# Patient Record
Sex: FEMALE | Race: OTHER | Employment: UNEMPLOYED | URBAN - METROPOLITAN AREA
[De-identification: names, ages, dates, MRNs, and addresses within clinical notes are randomized per-mention and may not be internally consistent; named-entity substitution may affect disease eponyms.]

---

## 2021-01-27 ENCOUNTER — HOSPITAL ENCOUNTER (OUTPATIENT)
Dept: LAB | Age: 31
Discharge: HOME | End: 2021-01-27

## 2021-01-27 ENCOUNTER — TELEPHONE (OUTPATIENT)
Dept: FAMILY MEDICINE CLINIC | Age: 31
End: 2021-01-27

## 2021-01-27 ENCOUNTER — OFFICE VISIT (OUTPATIENT)
Dept: FAMILY MEDICINE CLINIC | Age: 31
End: 2021-01-27

## 2021-01-27 VITALS
TEMPERATURE: 98 F | OXYGEN SATURATION: 100 % | HEART RATE: 79 BPM | HEIGHT: 60 IN | SYSTOLIC BLOOD PRESSURE: 124 MMHG | DIASTOLIC BLOOD PRESSURE: 77 MMHG | BODY MASS INDEX: 29.45 KG/M2 | WEIGHT: 150 LBS

## 2021-01-27 DIAGNOSIS — G93.31 POST VIRAL SYNDROME: Primary | ICD-10-CM

## 2021-01-27 DIAGNOSIS — Z23 ENCOUNTER FOR IMMUNIZATION: ICD-10-CM

## 2021-01-27 DIAGNOSIS — O03.9 MISCARRIAGE: ICD-10-CM

## 2021-01-27 DIAGNOSIS — R06.02 SHORTNESS OF BREATH: ICD-10-CM

## 2021-01-27 LAB
ALBUMIN SERPL-MCNC: 4 G/DL (ref 3.5–5)
ALBUMIN/GLOB SERPL: 1.2 {RATIO} (ref 1.1–2.2)
ALP SERPL-CCNC: 73 U/L (ref 45–117)
ALT SERPL-CCNC: 30 U/L (ref 12–78)
ANION GAP SERPL CALC-SCNC: 8 MMOL/L (ref 5–15)
AST SERPL-CCNC: 18 U/L (ref 15–37)
BASOPHILS # BLD: 0 K/UL (ref 0–0.1)
BASOPHILS NFR BLD: 1 % (ref 0–1)
BILIRUB SERPL-MCNC: 0.4 MG/DL (ref 0.2–1)
BUN SERPL-MCNC: 4 MG/DL (ref 6–20)
BUN/CREAT SERPL: 7 (ref 12–20)
CALCIUM SERPL-MCNC: 9.6 MG/DL (ref 8.5–10.1)
CHLORIDE SERPL-SCNC: 109 MMOL/L (ref 97–108)
CO2 SERPL-SCNC: 24 MMOL/L (ref 21–32)
CREAT SERPL-MCNC: 0.61 MG/DL (ref 0.55–1.02)
DIFFERENTIAL METHOD BLD: ABNORMAL
EOSINOPHIL # BLD: 0.1 K/UL (ref 0–0.4)
EOSINOPHIL NFR BLD: 2 % (ref 0–7)
ERYTHROCYTE [DISTWIDTH] IN BLOOD BY AUTOMATED COUNT: 14.9 % (ref 11.5–14.5)
GLOBULIN SER CALC-MCNC: 3.4 G/DL (ref 2–4)
GLUCOSE SERPL-MCNC: 86 MG/DL (ref 65–100)
HCT VFR BLD AUTO: 37.7 % (ref 35–47)
HGB BLD-MCNC: 11.7 G/DL (ref 11.5–16)
HGB BLD-MCNC: 12.1 G/DL
IMM GRANULOCYTES # BLD AUTO: 0 K/UL (ref 0–0.04)
IMM GRANULOCYTES NFR BLD AUTO: 0 % (ref 0–0.5)
LYMPHOCYTES # BLD: 1.8 K/UL (ref 0.8–3.5)
LYMPHOCYTES NFR BLD: 25 % (ref 12–49)
MCH RBC QN AUTO: 25.3 PG (ref 26–34)
MCHC RBC AUTO-ENTMCNC: 31 G/DL (ref 30–36.5)
MCV RBC AUTO: 81.6 FL (ref 80–99)
MONOCYTES # BLD: 0.5 K/UL (ref 0–1)
MONOCYTES NFR BLD: 6 % (ref 5–13)
NEUTS SEG # BLD: 4.8 K/UL (ref 1.8–8)
NEUTS SEG NFR BLD: 66 % (ref 32–75)
NRBC # BLD: 0 K/UL (ref 0–0.01)
NRBC BLD-RTO: 0 PER 100 WBC
PLATELET # BLD AUTO: 264 K/UL (ref 150–400)
PMV BLD AUTO: 12.7 FL (ref 8.9–12.9)
POTASSIUM SERPL-SCNC: 4.1 MMOL/L (ref 3.5–5.1)
PROT SERPL-MCNC: 7.4 G/DL (ref 6.4–8.2)
RBC # BLD AUTO: 4.62 M/UL (ref 3.8–5.2)
SODIUM SERPL-SCNC: 141 MMOL/L (ref 136–145)
WBC # BLD AUTO: 7.3 K/UL (ref 3.6–11)

## 2021-01-27 PROCEDURE — 90471 IMMUNIZATION ADMIN: CPT | Performed by: FAMILY MEDICINE

## 2021-01-27 PROCEDURE — 90686 IIV4 VACC NO PRSV 0.5 ML IM: CPT | Performed by: FAMILY MEDICINE

## 2021-01-27 PROCEDURE — 99203 OFFICE O/P NEW LOW 30 MIN: CPT | Performed by: FAMILY MEDICINE

## 2021-01-27 PROCEDURE — 85018 HEMOGLOBIN: CPT | Performed by: FAMILY MEDICINE

## 2021-01-27 RX ORDER — FLUTICASONE PROPIONATE AND SALMETEROL 55; 14 UG/1; UG/1
1 POWDER, METERED RESPIRATORY (INHALATION) DAILY
Qty: 1 EACH | Refills: 1 | OUTPATIENT
Start: 2021-01-27 | End: 2021-02-10

## 2021-01-27 RX ORDER — ASPIRIN 81 MG/1
81 TABLET ORAL DAILY
Qty: 90 TAB | Refills: 2 | Status: SHIPPED | OUTPATIENT
Start: 2021-01-27 | End: 2021-01-27 | Stop reason: SDUPTHER

## 2021-01-27 RX ORDER — ASPIRIN 81 MG/1
81 TABLET ORAL DAILY
Qty: 90 TAB | Refills: 2 | OUTPATIENT
Start: 2021-01-27 | End: 2021-02-10

## 2021-01-27 RX ORDER — FLUTICASONE PROPIONATE AND SALMETEROL 55; 14 UG/1; UG/1
1 POWDER, METERED RESPIRATORY (INHALATION) DAILY
Qty: 1 EACH | Refills: 1 | Status: SHIPPED | OUTPATIENT
Start: 2021-01-27 | End: 2021-01-27 | Stop reason: SDUPTHER

## 2021-01-27 NOTE — PROGRESS NOTES
Sharon Rodriguez RN administered flu vaccine. Please see scanned consent form for further details. Reviewed AVS, prescription and pharmacy location with patient. AVS printed and given. goodrx coupon card given. Walk in x-ray and care card process explained, financial assistance application given. This has been fully explained to the patient, who indicates understanding and agrees with plan. No further questions at this time.  Enzo Laura RN

## 2021-01-27 NOTE — PROGRESS NOTES
Results for orders placed or performed in visit on 01/27/21   AMB POC HEMOGLOBIN (HGB)   Result Value Ref Range    Hemoglobin (POC) 12.1

## 2021-01-27 NOTE — PROGRESS NOTES
HISTORY OF PRESENT ILLNESS  Romana Sandman is a 26 y.o. female. HPI  Patient states she has been feeling weak. She had COVID 20. The recovery was difficult. She was not admitted to the hospital.  Everyone in the family became sick. She had a sister who had to go to the hospital.    She feels she has a problem with the lungs. She feels her lungs are obstructed and feels  States in the morning when she wakes up or in the cold. She feels there is something pulling in the lungs. She feels tired, she needs to take energy pills. States she didn't know she was pregnant 1/15 she had a heavy bleeding and it was a spontaneous . Since then she feels even more tired. Patient also states that her memory is affected after having covid. She is forgetful. She feels cold all day long. She has to set the thermostat to 76. Patient denies any symptoms of depression. So far she is taking multivitamins and is taking 2 amoxicillins in the morning and 2 in the evening. Review of Systems   Constitutional: Positive for malaise/fatigue. HENT: Negative for ear discharge, ear pain, hearing loss and tinnitus. Eyes: Negative for blurred vision, double vision and photophobia. Respiratory: Positive for cough, sputum production, shortness of breath and wheezing. Cardiovascular: Negative for chest pain, palpitations and orthopnea. Gastrointestinal: Negative for abdominal pain, heartburn, nausea and vomiting. Genitourinary: Negative for dysuria, frequency and urgency. Musculoskeletal: Negative for back pain, myalgias and neck pain. Neurological: Negative for dizziness, tingling and headaches. /77 (BP 1 Location: Left arm, BP Patient Position: Sitting)   Pulse 79   Temp 98 °F (36.7 °C)   Ht 5' 0.32\" (1.532 m)   Wt 150 lb (68 kg)   SpO2 100%   BMI 28.99 kg/m²   Physical Exam  Constitutional:       General: She is not in acute distress.      Appearance: She is not ill-appearing. HENT:      Head: Normocephalic. Right Ear: Tympanic membrane normal. There is no impacted cerumen. Left Ear: Tympanic membrane normal. There is no impacted cerumen. Nose: Nose normal. No congestion or rhinorrhea. Mouth/Throat:      Mouth: Mucous membranes are moist.      Pharynx: No oropharyngeal exudate or posterior oropharyngeal erythema. Eyes:      General:         Right eye: No discharge. Left eye: No discharge. Extraocular Movements: Extraocular movements intact. Pupils: Pupils are equal, round, and reactive to light. Neck:      Musculoskeletal: Normal range of motion and neck supple. No neck rigidity or muscular tenderness. Cardiovascular:      Rate and Rhythm: Normal rate and regular rhythm. Pulses: Normal pulses. Heart sounds: Normal heart sounds. No murmur. Pulmonary:      Effort: Pulmonary effort is normal. No respiratory distress. Breath sounds: Normal breath sounds. No wheezing or rhonchi. Abdominal:      General: Bowel sounds are normal. There is no distension. Palpations: Abdomen is soft. Tenderness: There is no abdominal tenderness. Hernia: No hernia is present. Musculoskeletal: Normal range of motion. General: No swelling or tenderness. Skin:     General: Skin is warm and dry. Coloration: Skin is not jaundiced. Findings: No bruising or erythema. Neurological:      Mental Status: She is alert. ASSESSMENT and PLAN  Diagnoses and all orders for this visit:    1. Post viral syndrome  -     XR CHEST PA LAT; Future  -     aspirin delayed-release 81 mg tablet; Take 1 Tab by mouth daily. 2. Shortness of breath  -     XR CHEST PA LAT; Future  -     fluticasone propion-salmeteroL (AirDuo RespiClick) 53-22 mcg/actuation aepb; Take 1 Inhalation by inhalation daily. 3. Miscarriage  -     AMB POC HEMOGLOBIN (HGB)  -     METABOLIC PANEL, COMPREHENSIVE;  Future  -     CBC WITH AUTOMATED DIFF; Future  -     VITAMIN D, 25 HYDROXY; Future  -     VITAMIN B12; Future  -     TSH 3RD GENERATION; Future  -     HEMOGLOBIN A1C WITH EAG; Future    4. Encounter for immunization  -     INFLUENZA VIRUS [address] IM      26year old female who had covid in may 2020, since then she has had shortness of breath, wheezing, felt tired and has had some confusion. We will check labs today  X ray order  We will try air duo for the next month and start baby aspirin.   Follow up as needed

## 2021-01-28 ENCOUNTER — TELEPHONE (OUTPATIENT)
Dept: FAMILY MEDICINE CLINIC | Age: 31
End: 2021-01-28

## 2021-01-28 LAB
25(OH)D3 SERPL-MCNC: 17.3 NG/ML (ref 30–100)
EST. AVERAGE GLUCOSE BLD GHB EST-MCNC: 108 MG/DL
HBA1C MFR BLD: 5.4 % (ref 4–5.6)
TSH SERPL DL<=0.05 MIU/L-ACNC: 1.58 UIU/ML (ref 0.36–3.74)
VIT B12 SERPL-MCNC: 355 PG/ML (ref 193–986)

## 2021-01-28 RX ORDER — ERGOCALCIFEROL 1.25 MG/1
50000 CAPSULE ORAL
Qty: 12 CAP | Refills: 0 | Status: SHIPPED | OUTPATIENT
Start: 2021-01-28 | End: 2021-04-05 | Stop reason: SDUPTHER

## 2021-01-28 NOTE — PROGRESS NOTES
Vitamin D is low  I have sent a prescription for once a week vitamin D for 12 weeks to her pharmacy  Patient can be informed

## 2021-01-28 NOTE — PROGRESS NOTES
Normal hemoglobin a1c no diabetes  Normal vitamin B12  Normal thyroid  Normal blood count, liver function, kidney function and electrolytes  Patient can be informed

## 2021-01-29 ENCOUNTER — HOSPITAL ENCOUNTER (OUTPATIENT)
Dept: GENERAL RADIOLOGY | Age: 31
Discharge: HOME | End: 2021-01-29

## 2021-01-29 DIAGNOSIS — G93.31 POST VIRAL SYNDROME: ICD-10-CM

## 2021-01-29 DIAGNOSIS — R06.02 SHORTNESS OF BREATH: ICD-10-CM

## 2021-01-29 PROCEDURE — 71046 X-RAY EXAM CHEST 2 VIEWS: CPT

## 2021-01-29 NOTE — PROGRESS NOTES
Tc to the pt. The pt was given entire normal lab result message from the provider. Eloisa Ann assisted with the call. The pt was told about her Vit D was low and a Rx was sent to her Pharm to take take 1 pill a week, for 12 weeks. The pt stated she is on the way to the hospital to get her lungs xray and she was so dizzy she almost fainted. The pt was asked if she was driving and she stated her mother was driving her. The pt was advised to tell the staff at the hospital of this and go to the ED if needed. The pt stated yes she did eat breakfast and drinking plenty of water. She is not pregnant she had a miscarriage 14 days ago ( no bleeding) and she said the CAV  knows, and denies other sx.   Brian Sapp RN

## 2021-02-02 NOTE — PROGRESS NOTES
Tc to the pt with the  Susanne Ordoñez. The pt was given the entire lab results note from the Dr. Mayra Spencer was confirmed. The pt stated she had already been called and went and picked up the Rx and started it Fri. The pt went Fri for xrays. The pt is asking about the xray. The pt was told her chest xray is normal. The pt verbalized understanding.  Cherelle Rdz RN

## 2021-02-09 ENCOUNTER — APPOINTMENT (OUTPATIENT)
Dept: ULTRASOUND IMAGING | Age: 31
End: 2021-02-09
Attending: STUDENT IN AN ORGANIZED HEALTH CARE EDUCATION/TRAINING PROGRAM

## 2021-02-09 ENCOUNTER — HOSPITAL ENCOUNTER (OUTPATIENT)
Age: 31
Setting detail: OBSERVATION
Discharge: HOME | End: 2021-02-10
Attending: EMERGENCY MEDICINE | Admitting: OBSTETRICS & GYNECOLOGY

## 2021-02-09 DIAGNOSIS — N93.9 VAGINAL BLEEDING: Primary | ICD-10-CM

## 2021-02-09 DIAGNOSIS — R42 LIGHTHEADEDNESS: ICD-10-CM

## 2021-02-09 DIAGNOSIS — E87.6 HYPOKALEMIA: ICD-10-CM

## 2021-02-09 DIAGNOSIS — O20.0 THREATENED ABORTION: ICD-10-CM

## 2021-02-09 PROBLEM — Z3A.13 13 WEEKS GESTATION OF PREGNANCY: Status: ACTIVE | Noted: 2021-02-09

## 2021-02-09 LAB
ALBUMIN SERPL-MCNC: 3.6 G/DL (ref 3.5–5)
ALBUMIN/GLOB SERPL: 0.9 {RATIO} (ref 1.1–2.2)
ALP SERPL-CCNC: 71 U/L (ref 45–117)
ALT SERPL-CCNC: 24 U/L (ref 12–78)
ANION GAP SERPL CALC-SCNC: 11 MMOL/L (ref 5–15)
APPEARANCE UR: ABNORMAL
AST SERPL-CCNC: 17 U/L (ref 15–37)
BACTERIA URNS QL MICRO: NEGATIVE /HPF
BASOPHILS # BLD: 0 K/UL (ref 0–0.1)
BASOPHILS NFR BLD: 0 % (ref 0–1)
BILIRUB SERPL-MCNC: 0.4 MG/DL (ref 0.2–1)
BILIRUB UR QL: NEGATIVE
BUN SERPL-MCNC: 6 MG/DL (ref 6–20)
BUN/CREAT SERPL: 7 (ref 12–20)
CALCIUM SERPL-MCNC: 8.7 MG/DL (ref 8.5–10.1)
CHLORIDE SERPL-SCNC: 106 MMOL/L (ref 97–108)
CO2 SERPL-SCNC: 21 MMOL/L (ref 21–32)
COLOR UR: ABNORMAL
COMMENT, HOLDF: NORMAL
COMMENT, HOLDF: NORMAL
CREAT SERPL-MCNC: 0.82 MG/DL (ref 0.55–1.02)
DIFFERENTIAL METHOD BLD: ABNORMAL
EOSINOPHIL # BLD: 0.1 K/UL (ref 0–0.4)
EOSINOPHIL NFR BLD: 1 % (ref 0–7)
EPITH CASTS URNS QL MICRO: ABNORMAL /LPF
ERYTHROCYTE [DISTWIDTH] IN BLOOD BY AUTOMATED COUNT: 13.9 % (ref 11.5–14.5)
GLOBULIN SER CALC-MCNC: 4.2 G/DL (ref 2–4)
GLUCOSE SERPL-MCNC: 83 MG/DL (ref 65–100)
GLUCOSE UR STRIP.AUTO-MCNC: NEGATIVE MG/DL
HCG SERPL-ACNC: ABNORMAL MIU/ML (ref 0–6)
HCT VFR BLD AUTO: 30.6 % (ref 35–47)
HCT VFR BLD AUTO: 37.2 % (ref 35–47)
HGB BLD-MCNC: 11.9 G/DL (ref 11.5–16)
HGB BLD-MCNC: 9.8 G/DL (ref 11.5–16)
HGB UR QL STRIP: ABNORMAL
HYALINE CASTS URNS QL MICRO: ABNORMAL /LPF (ref 0–5)
IMM GRANULOCYTES # BLD AUTO: 0 K/UL (ref 0–0.04)
IMM GRANULOCYTES NFR BLD AUTO: 0 % (ref 0–0.5)
KETONES UR QL STRIP.AUTO: >80 MG/DL
LEUKOCYTE ESTERASE UR QL STRIP.AUTO: ABNORMAL
LYMPHOCYTES # BLD: 2.8 K/UL (ref 0.8–3.5)
LYMPHOCYTES NFR BLD: 27 % (ref 12–49)
MCH RBC QN AUTO: 25.2 PG (ref 26–34)
MCHC RBC AUTO-ENTMCNC: 32 G/DL (ref 30–36.5)
MCV RBC AUTO: 78.6 FL (ref 80–99)
MONOCYTES # BLD: 0.6 K/UL (ref 0–1)
MONOCYTES NFR BLD: 6 % (ref 5–13)
NEUTS SEG # BLD: 6.7 K/UL (ref 1.8–8)
NEUTS SEG NFR BLD: 66 % (ref 32–75)
NITRITE UR QL STRIP.AUTO: NEGATIVE
NRBC # BLD: 0 K/UL (ref 0–0.01)
NRBC BLD-RTO: 0 PER 100 WBC
PH UR STRIP: 6.5 [PH] (ref 5–8)
PLATELET # BLD AUTO: 277 K/UL (ref 150–400)
PMV BLD AUTO: 11.8 FL (ref 8.9–12.9)
POTASSIUM SERPL-SCNC: 3.2 MMOL/L (ref 3.5–5.1)
PROT SERPL-MCNC: 7.8 G/DL (ref 6.4–8.2)
PROT UR STRIP-MCNC: 30 MG/DL
RBC # BLD AUTO: 4.73 M/UL (ref 3.8–5.2)
RBC #/AREA URNS HPF: >100 /HPF (ref 0–5)
SAMPLES BEING HELD,HOLD: NORMAL
SAMPLES BEING HELD,HOLD: NORMAL
SODIUM SERPL-SCNC: 138 MMOL/L (ref 136–145)
SP GR UR REFRACTOMETRY: 1.02 (ref 1–1.03)
UROBILINOGEN UR QL STRIP.AUTO: 1 EU/DL (ref 0.2–1)
WBC # BLD AUTO: 10.3 K/UL (ref 3.6–11)
WBC URNS QL MICRO: ABNORMAL /HPF (ref 0–4)

## 2021-02-09 PROCEDURE — 81001 URINALYSIS AUTO W/SCOPE: CPT

## 2021-02-09 PROCEDURE — 85025 COMPLETE CBC W/AUTO DIFF WBC: CPT

## 2021-02-09 PROCEDURE — 99218 HC RM OBSERVATION: CPT

## 2021-02-09 PROCEDURE — 74011250636 HC RX REV CODE- 250/636: Performed by: STUDENT IN AN ORGANIZED HEALTH CARE EDUCATION/TRAINING PROGRAM

## 2021-02-09 PROCEDURE — 85014 HEMATOCRIT: CPT

## 2021-02-09 PROCEDURE — 76817 TRANSVAGINAL US OBSTETRIC: CPT

## 2021-02-09 PROCEDURE — 84702 CHORIONIC GONADOTROPIN TEST: CPT

## 2021-02-09 PROCEDURE — 74011250637 HC RX REV CODE- 250/637: Performed by: STUDENT IN AN ORGANIZED HEALTH CARE EDUCATION/TRAINING PROGRAM

## 2021-02-09 PROCEDURE — 86900 BLOOD TYPING SEROLOGIC ABO: CPT

## 2021-02-09 PROCEDURE — 36415 COLL VENOUS BLD VENIPUNCTURE: CPT

## 2021-02-09 PROCEDURE — 76801 OB US < 14 WKS SINGLE FETUS: CPT

## 2021-02-09 PROCEDURE — 80053 COMPREHEN METABOLIC PANEL: CPT

## 2021-02-09 PROCEDURE — 99285 EMERGENCY DEPT VISIT HI MDM: CPT

## 2021-02-09 RX ORDER — SODIUM CHLORIDE 0.9 % (FLUSH) 0.9 %
5-40 SYRINGE (ML) INJECTION AS NEEDED
Status: DISCONTINUED | OUTPATIENT
Start: 2021-02-09 | End: 2021-02-10 | Stop reason: HOSPADM

## 2021-02-09 RX ORDER — POTASSIUM CHLORIDE 750 MG/1
30 TABLET, FILM COATED, EXTENDED RELEASE ORAL
Status: COMPLETED | OUTPATIENT
Start: 2021-02-09 | End: 2021-02-09

## 2021-02-09 RX ORDER — SODIUM CHLORIDE 0.9 % (FLUSH) 0.9 %
5-40 SYRINGE (ML) INJECTION EVERY 8 HOURS
Status: DISCONTINUED | OUTPATIENT
Start: 2021-02-09 | End: 2021-02-10 | Stop reason: HOSPADM

## 2021-02-09 RX ORDER — ACETAMINOPHEN 325 MG/1
650 TABLET ORAL
Status: DISCONTINUED | OUTPATIENT
Start: 2021-02-09 | End: 2021-02-10 | Stop reason: HOSPADM

## 2021-02-09 RX ORDER — POLYETHYLENE GLYCOL 3350 17 G/17G
17 POWDER, FOR SOLUTION ORAL DAILY PRN
Status: DISCONTINUED | OUTPATIENT
Start: 2021-02-09 | End: 2021-02-10 | Stop reason: HOSPADM

## 2021-02-09 RX ORDER — ONDANSETRON 2 MG/ML
4 INJECTION INTRAMUSCULAR; INTRAVENOUS
Status: DISCONTINUED | OUTPATIENT
Start: 2021-02-09 | End: 2021-02-10 | Stop reason: HOSPADM

## 2021-02-09 RX ORDER — PROMETHAZINE HYDROCHLORIDE 25 MG/1
12.5 TABLET ORAL
Status: DISCONTINUED | OUTPATIENT
Start: 2021-02-09 | End: 2021-02-10 | Stop reason: HOSPADM

## 2021-02-09 RX ORDER — ACETAMINOPHEN 650 MG/1
650 SUPPOSITORY RECTAL
Status: DISCONTINUED | OUTPATIENT
Start: 2021-02-09 | End: 2021-02-10 | Stop reason: HOSPADM

## 2021-02-09 RX ADMIN — POTASSIUM CHLORIDE 30 MEQ: 750 TABLET, FILM COATED, EXTENDED RELEASE ORAL at 18:33

## 2021-02-09 RX ADMIN — SODIUM CHLORIDE 1000 ML: 9 INJECTION, SOLUTION INTRAVENOUS at 17:29

## 2021-02-09 NOTE — ED PROVIDER NOTES
33yo  female with Vit D deficiency presents for persistent fatigue and lightheadedness since May. States that she had Covid in May 2020 and feels that she never really recovered. Has been weak, dizzy, and fatigued since this time. On 1/15 patient had a spontaneous  after being pregnant for 6 weeks. States that she did not receive prenatal care of care after . She just had terrible cramps and passed large clots. While sitting in the waiting room patient started having profuse vaginal bleeding and passing clots. States she had abdominal cramping for the past 3 weeks since the miscarriage but has not had any vaginal bleeding since then. Denies chest pain, SOB, cough, nausea, and vomiting. No past medical history on file. No past surgical history on file. No family history on file.     Social History     Socioeconomic History    Marital status: SINGLE     Spouse name: Not on file    Number of children: Not on file    Years of education: Not on file    Highest education level: Not on file   Occupational History    Not on file   Social Needs    Financial resource strain: Not on file    Food insecurity     Worry: Not on file     Inability: Not on file    Transportation needs     Medical: Not on file     Non-medical: Not on file   Tobacco Use    Smoking status: Never Smoker    Smokeless tobacco: Never Used   Substance and Sexual Activity    Alcohol use: Not Currently    Drug use: Never    Sexual activity: Not on file   Lifestyle    Physical activity     Days per week: Not on file     Minutes per session: Not on file    Stress: Not on file   Relationships    Social connections     Talks on phone: Not on file     Gets together: Not on file     Attends Rastafari service: Not on file     Active member of club or organization: Not on file     Attends meetings of clubs or organizations: Not on file     Relationship status: Not on file    Intimate partner violence     Fear of current or ex partner: Not on file     Emotionally abused: Not on file     Physically abused: Not on file     Forced sexual activity: Not on file   Other Topics Concern    Not on file   Social History Narrative    Not on file         ALLERGIES: Patient has no known allergies. Review of Systems   Constitutional: Positive for fatigue. Negative for fever. HENT: Negative for sore throat. Eyes: Negative for visual disturbance. Respiratory: Negative for shortness of breath. Cardiovascular: Negative for chest pain. Gastrointestinal: Negative for nausea and vomiting. Genitourinary: Positive for pelvic pain and vaginal bleeding. Musculoskeletal: Negative for myalgias. Skin: Negative for rash. Neurological: Positive for dizziness. Psychiatric/Behavioral: Negative for confusion. Vitals:    21 2030 21 2045 21 2100 21 2130   BP: 103/66 110/60 106/70 110/82   Pulse: 76 73 79 83   Resp: 23 19 20 14   Temp:       SpO2: 100% 100% 100% 100%   Weight:       Height:                Physical Exam  Constitutional:       Appearance: She is normal weight. HENT:      Head: Normocephalic. Eyes:      Extraocular Movements: Extraocular movements intact. Conjunctiva/sclera: Conjunctivae normal.   Neck:      Musculoskeletal: Normal range of motion. Cardiovascular:      Rate and Rhythm: Normal rate and regular rhythm. Pulses: Normal pulses. Heart sounds: Normal heart sounds. Pulmonary:      Effort: Pulmonary effort is normal.      Breath sounds: Normal breath sounds. Abdominal:      Palpations: Abdomen is soft. Tenderness: There is abdominal tenderness. Neurological:      Mental Status: She is alert. MDM  Number of Diagnoses or Management Options  Hypokalemia  Lightheadedness  Vaginal bleeding  Diagnosis management comments:     Vaginal bleeding in the setting of 12 wk SIUP. Threatened  vs inevitable .  TVUS showed SIUP @ 12 weeks with small subchorionic hemorrhage. Patient continued to have active bleeding after US. Speculum exam performed and copious clots were visualized in the vaginal vault. Cervix was not visualized. 9:41pm patient continued bleeding and passing large clots, OB hospitalist Dr. Akiko Montelongo came down to examine patient, large golf ball sie clot evacuated. transabdomina ultrasound revealed live fetus . Will admit to [facility] for observation.      ED Course as of Feb 09 2143   Tue Feb 09, 2021 1951 ABO/Rh(D): B POSITIVE [AF]      ED Course User Index  [AF] Michael Brown MD       Procedures

## 2021-02-09 NOTE — ED NOTES
#[id number] used to re-evaluate patient with Dr. Afsaneh Kulkarni. Patient reports that she had COVID in May 2020 and since she has had SOB and headaches. Pt also reports that she was approx 6 weeks pregnant but miscarried on 1/15/21. Has not seen a OBGYN for followup or had any bleeding since. Pt reports she has been having abd cramping x 3 days. Reports this afternoon, she began having dizziness and weakness when trying to stand or change positions while laying in bed. While in the waiting room, she suddenly began having large amounts of vaginal bleeding with some clots noted.

## 2021-02-09 NOTE — ED TRIAGE NOTES
Interpretor #[id number] used for triage. Pt reports she had covid in May and since then she has had complications. Pt reports she has been having generalized weakness and fatigue. Says she just wants to be laying down all the time. Denies any other sx. PCP started her on vitamin D, an inhaler, and aspirin but has not gotten any better.

## 2021-02-09 NOTE — ED NOTES
5:12 PM  I have evaluated the patient as the Provider in Triage. I have reviewed Her vital signs and the triage nurse assessment. I have talked with the patient and any available family and advised that I am the provider in triage and have ordered the appropriate study to initiate their work up based on the clinical presentation during my assessment. I have advised that the patient will be accommodated in the Main ED as soon as possible. I have also requested to contact the triage nurse or myself immediately if the patient experiences any changes in their condition during this brief waiting period. Patient complaining of left lower quadrant pain. Denies pregnancy. Patient is vaginally bleeding in the chair with blood clots noted. States she has increased fatigue. Brought immediately to the back for further evaluation and treatment.   Murray Gray NP

## 2021-02-10 VITALS
BODY MASS INDEX: 28.52 KG/M2 | DIASTOLIC BLOOD PRESSURE: 61 MMHG | HEIGHT: 60 IN | RESPIRATION RATE: 22 BRPM | TEMPERATURE: 98.6 F | WEIGHT: 145.28 LBS | HEART RATE: 83 BPM | OXYGEN SATURATION: 100 % | SYSTOLIC BLOOD PRESSURE: 99 MMHG

## 2021-02-10 LAB
ABO + RH BLD: NORMAL
BLOOD BANK CMNT PATIENT-IMP: NORMAL
HCT VFR BLD AUTO: 28.6 % (ref 35–47)
HCT VFR BLD AUTO: 29.6 % (ref 35–47)
HGB BLD-MCNC: 9.2 G/DL (ref 11.5–16)
HGB BLD-MCNC: 9.5 G/DL (ref 11.5–16)

## 2021-02-10 PROCEDURE — 99218 HC RM OBSERVATION: CPT

## 2021-02-10 PROCEDURE — 36415 COLL VENOUS BLD VENIPUNCTURE: CPT

## 2021-02-10 PROCEDURE — 74011250637 HC RX REV CODE- 250/637: Performed by: STUDENT IN AN ORGANIZED HEALTH CARE EDUCATION/TRAINING PROGRAM

## 2021-02-10 PROCEDURE — 85014 HEMATOCRIT: CPT

## 2021-02-10 PROCEDURE — 74011250637 HC RX REV CODE- 250/637: Performed by: OBSTETRICS & GYNECOLOGY

## 2021-02-10 RX ORDER — SWAB
1 SWAB, NON-MEDICATED MISCELLANEOUS DAILY
Qty: 30 TAB | Refills: 2 | Status: SHIPPED | OUTPATIENT
Start: 2021-02-10 | End: 2021-03-08 | Stop reason: SDUPTHER

## 2021-02-10 RX ORDER — DOCUSATE SODIUM 100 MG/1
100 CAPSULE, LIQUID FILLED ORAL 2 TIMES DAILY
Qty: 60 CAP | Refills: 2 | Status: SHIPPED | OUTPATIENT
Start: 2021-02-10 | End: 2021-05-11

## 2021-02-10 RX ORDER — LANOLIN ALCOHOL/MO/W.PET/CERES
1 CREAM (GRAM) TOPICAL
Status: DISCONTINUED | OUTPATIENT
Start: 2021-02-10 | End: 2021-02-10 | Stop reason: HOSPADM

## 2021-02-10 RX ORDER — SWAB
1 SWAB, NON-MEDICATED MISCELLANEOUS DAILY
Status: DISCONTINUED | OUTPATIENT
Start: 2021-02-10 | End: 2021-02-10 | Stop reason: HOSPADM

## 2021-02-10 RX ORDER — FERROUS SULFATE 325(65) MG
1 TABLET, DELAYED RELEASE (ENTERIC COATED) ORAL
Status: DISCONTINUED | OUTPATIENT
Start: 2021-02-10 | End: 2021-02-10

## 2021-02-10 RX ORDER — FERROUS SULFATE 325(65) MG
325 TABLET, DELAYED RELEASE (ENTERIC COATED) ORAL
Qty: 90 TAB | Refills: 1 | Status: SHIPPED | OUTPATIENT
Start: 2021-02-10 | End: 2021-03-12

## 2021-02-10 RX ADMIN — FERROUS SULFATE TAB 325 MG (65 MG ELEMENTAL FE) 325 MG: 325 (65 FE) TAB at 15:27

## 2021-02-10 RX ADMIN — Medication 10 ML: at 14:00

## 2021-02-10 RX ADMIN — ACETAMINOPHEN 650 MG: 325 TABLET ORAL at 07:15

## 2021-02-10 RX ADMIN — Medication 1 TABLET: at 13:42

## 2021-02-10 RX ADMIN — Medication 10 ML: at 06:49

## 2021-02-10 NOTE — ED NOTES
TRANSFER - OUT REPORT:    Verbal report given to RN on Valentino Mesi  being transferred to [phone] for routine progression of care       Report consisted of patients Situation, Background, Assessment and   Recommendations(SBAR). Information from the following report(s) SBAR, ED Summary, [facility] and Recent Results was reviewed with the receiving nurse. Opportunity for questions and clarification was provided.

## 2021-02-10 NOTE — PROGRESS NOTES
Shift Change:    Bedside and Verbal shift change report given to Adrien RN (oncoming nurse) by Francisco J Johnson RN (offgoing nurse). Report included the following information SBAR, Kardex, Intake/Output, MAR and Recent Results. 1020: MD aware patient hypotensive, no new orders. 1145: Spoke with MD about patient being hypotensive and tachycardic. Orders for H&H       Bedside and Verbal shift change report given to Juan Armstrong RN (oncoming nurse) by Aaliyah Stephen RN (offgoing nurse). Report included the following information SBAR, Kardex, Intake/Output, MAR, Recent Results and Cardiac Rhythm Sinus Tach.

## 2021-02-10 NOTE — ED NOTES
Bedside and Verbal shift change report given to Adrien (oncoming nurse) by Mone Dickson (offgoing nurse). Report included the following information SBAR, Kardex, ED Summary, Intake/Output, MAR and Recent Results.

## 2021-02-10 NOTE — DISCHARGE SUMMARY
Obstetrical Discharge Summary     Name: Pearl Man MRN: [de-identified]  SSN: [id number]    YOB: 1990  Age: 26 y.o. Sex: female      Admit Date: 2021    Discharge Date: 2/10/2021     Admitting Physician: Dom Zepeda MD   Physician at discharge: Dr. Johanna Xiong    Attending Physician:  Hugo Quintanilla MD       Admission Diagnoses: Threatened  [O20.0]  13 weeks gestation of pregnancy [Z3A.13]    Discharge Diagnoses: Additional Diagnoses:   Hospital Problems  Date Reviewed: 2021          Codes Class Noted POA    Threatened  ICD-10-CM: O20.0  ICD-9-CM: 640.00  2021 Unknown        13 weeks gestation of pregnancy ICD-10-CM: Z3A.13  ICD-9-CM: V22.2  2021 Unknown           No results found for: RUBELLAEXT, GRBSEXT    Immunization(s):   Immunization History   Administered Date(s) Administered    Influenza Vaccine [employer]) PF (>6 Mo Flulaval, Fluarix, and >3 Vergie Pollock 74617) 2021        Hospital Course:     Pearl Man is a 26 y.o.  at 13w1d by LMP and 12w2d by [address] admitted for subchorionic hemorrhage and threatened . Patient on admission with significant bleeding and blood clots, bleeding has since improved since admission. On discharge patient reports mild bleeding less than menses. Patient state mild pelvic tenderness. She denies fever, chills, HA, vision disturbances, RUQ pain, chest pain, SOB, N/V, urinary problems or LOF. Threatened : 2/2 to small subchorionic hemorrhage seen on US. Hb.2--->9.5 on discharge. At this time bleeding is less than menses. - Start Prenatal vitamins daily, can continue Vitamin D supplements. - Continue PO diet and hydration.  - Return precautions given. Patient advise to return to ED if symptoms bleeding worsen. - Patient with follow up appointment tomorrow at [facility] with Dr. Virgen Posadas: at 12w2d viable seen on [address].  Bhc,196, complicated by subchorionic hemorrhage. Bedside US: + cardiac activity at 160, adequate fetal movement and fluid. - Continue expectant management  - Follow up tomorrow in clinic for prenatal visit and care. - Start PNV      Anemia: likley 2/2 to bleeding since 01/15. POA: 11.9 then 9.2--->9.5  On discharge   - Start ferrous sulfate 325 mg TID and continue PNV.  - Follow up with PCP  - Return precautions given. Patient advise to return to ED if symptoms bleeding worsen. Lab Results   Component Value Date/Time    Hemoglobin (POC) 12.1 01/27/2021 10:42 AM    HGB 9.5 (L) 02/10/2021 12:15 PM             Follow up test at discharge:none  Condition at Discharge:  stable  Disposition: Discharge to Home    Physical exam:  Visit Vitals  BP 99/61   Pulse 83   Temp 98.6 °F (37 °C)   Resp 22   Ht 5' 0.32\" (1.532 m)   Wt 65.9 kg (145 lb 4.5 oz)   LMP 11/10/2020 (Exact Date) Comment: pt thought she misscarried in January   SpO2 100%   Breastfeeding No   BMI 28.07 kg/m²       Exam:          GEN:  Patient without distress. Resting comfortably on her back. CTAB, no wheezing/rales/rhonchi. RRR, +S1 and S2. No audible murmur. ABD: Mild tenderness on palpation. Uterus below umbilicus. EXT:  No edema. No palpable cords or tenderness     NEURO: No acute focal findings. PSY: Normal mood and affect. Patient Instructions:   Current Discharge Medication List      START taking these medications    Details   docusate sodium (Colace) 100 mg capsule Take 1 Cap by mouth two (2) times a day for 90 days. Qty: 60 Cap, Refills: 2      ferrous sulfate (IRON) 325 mg (65 mg iron) EC tablet Take 1 Tab by mouth three (3) times daily (with meals) for 30 days. Qty: 90 Tab, Refills: 1      prenatal vit-iron fumarate-fa (PRENATAL PLUS with IRON) 28 mg iron- 800 mcg tab Take 1 Tab by mouth daily for 30 days.   Qty: 30 Tab, Refills: 2         CONTINUE these medications which have NOT CHANGED    Details   ergocalciferol (ERGOCALCIFEROL) 1,250 mcg (50,000 unit) capsule Take 1 Cap by mouth every seven (7) days. Qty: 12 Cap, Refills: 0         STOP taking these medications       fluticasone propion-salmeteroL (AirDuo RespiClick) 60-71 mcg/actuation aepb Comments:   Reason for Stopping:         aspirin delayed-release 81 mg tablet Comments:   Reason for Stopping:               Reference my discharge instructions.     Follow-up Information     Follow up With Specialties Details Why Contact Info    Kyra Perez MD Family Medicine On 2/11/2021 [place] [facility] las 3:45 PM  [address] [facility] [address]  [phone]      [place] [id number], [place], [place] Physician Assistant   [address]  [address] [facility]  [address] [place] [address]  [phone]              Signed By:  Chandler Seip, MD    Family Medicine Resident

## 2021-02-10 NOTE — ED NOTES
Bedside and Verbal shift change report given to Lory Mclain RN (oncoming nurse) by Marian Hurtado RN (offgoing nurse). Report included the following information SBAR, Kardex, ED Summary, [facility] and Recent Results.

## 2021-02-10 NOTE — H&P
History and Physical    Patient: Tommy Rosas MRN: [de-identified]  SSN: [id number]    YOB: 1990  Age: 26 y.o. Sex: female      Subjective:      Tommy Rosas is a 26 y.o. [id number] who presents to the ER with for generalized fatigue and while she was in waiting room started actively bleeding vaginally. Her LMP was 11/10/2020, but states she was told she had a miscarriage 1/28/2021 when she had a day of bleeding at that time. She has not had any bleeding since that day until today. Ultrasound done in ED revealed a viable 12 2/7 weekk IUP with FHR of 164 and small subchorionic hemorrhage. .On my arrival to ER pt passed a golf ball sized clot and had active bleeding noted on the padding. POB:  G1- 34  Week PTD  G2- current, thought she miscarried 2 weeks ago    PGYN:  Denies STI    PMH  Asthma    No past surgical history on file. -reviewed and negative    No family history on file. reviewed, noncontributory    Social History     Tobacco Use    Smoking status: Never Smoker    Smokeless tobacco: Never Used   Substance Use Topics    Alcohol use: Not Currently      Prior to Admission medications    Medication Sig Start Date End Date Taking? Authorizing Provider   ergocalciferol (ERGOCALCIFEROL) 1,250 mcg (50,000 unit) capsule Take 1 Cap by mouth every seven (7) days. 1/28/21   Claudia Bowen MD   fluticasone propion-salmeteroL (AirDuo RespiClick) 72-31 mcg/actuation aepb Take 1 Inhalation by inhalation daily. 1/27/21   Claudia Bowen MD   aspirin delayed-release 81 mg tablet Take 1 Tab by mouth daily. 1/27/21   Claudia Bowen MD        No Known Allergies    Review of Systems:  A comprehensive review of systems was negative except for that written in the History of Present Illness.     Objective:     Vitals:    02/09/21 2030 02/09/21 2045 02/09/21 2100 02/09/21 2130   BP: 103/66 110/60 106/70 110/82   Pulse: 76 73 79 83   Resp: 23 19 20 14   Temp: SpO2: 100% 100% 100% 100%   Weight:       Height:            Physical Exam:  GENERAL: alert, cooperative, no distress, appears stated age  LUNG: clear to auscultation bilaterally  HEART: regular rate and rhythm, S1, S2 normal, no murmur, click, rub or gallop  ABDOMEN: soft, non-tender. Bowel sounds normal. No masses,  no organomegaly  NEUROLOGIC: negative  Pelvic:  SSE: blood in vault, 100cc clot cleared and no further bleeding from the cervical os. SVE: external os 1cm, cannot reach internal os, 12 week size RV uterus    Bedside ultrasound by me: + cardiac activity at 160, fetal movement and adequate fluid. Assessment:     A/ 27 yo  at 13 0/7 weeks by LMP and 12 2/7 weeks by todays ultrasound with a subchorionic hemorrhage and  threatened AB. Significant bleeding in ER seems to have slowed down. Plan:     A/ will admit for observation, ethics consult, repeat CBC, monitor for further bleeding.       Signed By: Gabriela Sams MD     2021

## 2021-02-10 NOTE — DISCHARGE INSTRUCTIONS
HOME DISCHARGE INSTRUCTIONS    Kalin Batter / [de-identified] : 1990    Admission date: 2021 Discharge date: 2/10/2021 12:50 PM     Please bring this form with you to show your care provider at your follow-up appointment. Primary care provider:  DANNA Roque    Discharging provider:  Joseph Pringle MD  - Family Medicine Resident  Rey Uribe MD - Family Medicine Attending      You have been admitted to the hospital with the following diagnoses:    ACUTE DIAGNOSES:  · Threatened  [O20.0]  · 13 weeks gestation of pregnancy [Z3A.13]     . . . . . . . . . . . . . . . . . . . . . .     Current Discharge Medication List      START taking these medications    Details   docusate sodium (Colace) 100 mg capsule Take 1 Cap by mouth two (2) times a day for 90 days. Qty: 60 Cap, Refills: 2      ferrous sulfate (IRON) 325 mg (65 mg iron) EC tablet Take 1 Tab by mouth three (3) times daily (with meals) for 30 days. Qty: 90 Tab, Refills: 1      prenatal vit-iron fumarate-fa (PRENATAL PLUS with IRON) 28 mg iron- 800 mcg tab Take 1 Tab by mouth daily for 30 days. Qty: 30 Tab, Refills: 2         CONTINUE these medications which have NOT CHANGED    Details   ergocalciferol (ERGOCALCIFEROL) 1,250 mcg (50,000 unit) capsule Take 1 Cap by mouth every seven (7) days. Qty: 12 Cap, Refills: 0         STOP taking these medications       fluticasone propion-salmeteroL (AirDuo RespiClick) 13-44 mcg/actuation aepb Comments:   Reason for Stopping:         aspirin delayed-release 81 mg tablet Comments:   Reason for Stopping:        No other changes were made to your medications, please take all your other home medicines as previously prescribed. . . . . . . . . . . . . . . . . . . . . . . . . . . . . . . . . . . . . . . . . . . . . . . . . . . . . . . . . . . . . . . . . . . . . . . . Susanne Kam FOLLOW-UP CARE RECOMMENDATIONS: Regrese a la jackie de emergencia si experimenta sangrado excesivo. Appointments  Follow up with PCP    Follow-up Information     Follow up With Specialties Details Why Contact Info    Prasad Cervantes MD Family Medicine On 2/11/2021 Severoraven murphyalfredomidagomuriel bruno 3:45 PM  [address] [facility] [place] Debbie Lopez   [phone]               Follow-up tests needed: none     Pending test results: At the time of your discharge the following test results are still pending: none. Please make sure you review these results with your outpatient follow-up provider(s). DIET/what to eat:  Regular Diet    Specific symptoms to watch for: chest pain, shortness of breath, fever, chills, nausea, vomiting, diarrhea, change in mentation, falling, weakness, bleeding. What to do if new or unexpected symptoms occur? If you experience any of the above symptoms (or should other concerns or questions arise after discharge) please call your primary care physician. Return to the emergency room if you cannot get hold of your doctor. · It is very important that you keep your follow-up appointment(s). qq  · Please bring discharge papers, medication list (and/or medication bottles) to your follow-up appointments for review by your outpatient provider(s). · Please check the list of medications and be sure it includes every medication (even non-prescription medications) that your provider wants you to take. · It is important that you take the medication exactly as they are prescribed. · Keep your medication in the bottles provided by the pharmacist and keep a list of the medication names, dosages, and times to be taken in your wallet. · Do not take other medications without consulting your doctor. · If you have any questions about your medications or other instructions, please talk to your nurse or care provider before you leave the hospital.     Information obtained by:     I understand that if any problems occur once I am at home I am to contact my physician.     These instructions were explained to me and I had the opportunity to ask questions. I understand and acknowledge receipt of the instructions indicated above.                                                                                                                                                Physician's or R.N.'s Signature                                                                  Date/Time                                                                                                                                              Patient or Representative Signature                                                          Date/Time

## 2021-02-10 NOTE — PROGRESS NOTES
Reason for Admission:   Threatened , 13 weeks gestation of pregnancy                   RUR Score:  N/A                   Plan for utilizing home health:    No      PCP: First and Last name:     Name of Practice:    Are you a current patient: Yes/No:    Approximate date of last visit:    Can you participate in a virtual visit with your PCP:                     Current Advanced Directive/Advance Care Plan: No acp docs                         Transition of Care Plan:                    Initial assessment completed with interpretation provided by patient's nurse, Teodoro Mason. Patient lives at charted address with her mother and step-father. Patient is uninsured and was provided a copy of the [employer] care card in [facility] [place] Patient was also informed of her observation status and the observation notification letter was also provided in Macedonian to the patient. No discharge needs anticipated. 1. Patient to establish with PCP  2.  Family to transport at discharge    Care Management Interventions  Current Support Network: Relative's Home  Confirm Follow Up Transport: Family  The Plan for Transition of Care is Related to the Following Treatment Goals : Threatened , 13 weeks gestation of pregnancy  Discharge Location  Discharge Placement: Home with family assistance     Frank Harmon LCSW

## 2021-02-10 NOTE — PROGRESS NOTES
Antepartum Progress Note    Name: Courtney Strong MRN: [de-identified]  SSN: [id number]    YOB: 1990  Age: 26 y.o. Sex: female      Subjective:     LMP was 11/10/2020    Patient admitted for threatened  and subchorionic hemorrhage on US. At this time patient reports headaches, feeling weak and lightheaded. The patient reports improvement of her vaginal bleeding since admission. No active bleeding at this time unless \" patient moves significantly\". Patient denies nausea, vomiting, chest pain, COB, cough, syncope, changes in urine or bowels. Objective:     Vitals:  Blood pressure (!) 105/54, pulse 66, temperature 98.3 °F (36.8 °C), resp. rate 16, height 5' 0.32\" (1.532 m), weight 65.9 kg (145 lb 4.5 oz), last menstrual period 11/10/2020, SpO2 100 %, not currently breastfeeding. Temp (24hrs), Av.9 °F (36.6 °C), Min:97.5 °F (36.4 °C), Max:98.3 °F (59.2 °C)    Systolic (93CFO), HES:018 , Min:88 , ZDN:625      Diastolic (94YOH), AZV:49, Min:54, Max:83         Physical Exam:  Patient without distress. Heart: Regular rate and rhythm  Lung: clear to auscultation throughout lung fields, no wheezes, no rales, no rhonchi and normal respiratory effort  Abdomen: soft, mild pelvic tenderness on palpation, but no rebound or guarding. SVE by Dr. Khan Meter: external os 1cm, cannot reach internal os, 12 week size RV uterus. Bedside US by Dr. Khan Meter: + cardiac activity at 160, fetal movement and adequate fluid.       Labs:   Recent Results (from the past 24 hour(s))   BLOOD TYPE, (ABO+RH)    Collection Time: 21  5:05 PM   Result Value Ref Range    ABO/Rh(D) B POSITIVE     Comment SAMPLE NOT USABLE FOR CROSSMATCH    CBC WITH AUTOMATED DIFF    Collection Time: 21  5:06 PM   Result Value Ref Range    WBC 10.3 3.6 - 11.0 K/uL    RBC 4.73 3.80 - 5.20 M/uL    HGB 11.9 11.5 - 16.0 g/dL    HCT 37.2 35.0 - 47.0 %    MCV 78.6 (L) 80.0 - 99.0 FL    MCH 25.2 (L) 26.0 - 34.0 PG    MCHC 32.0 30.0 - 36.5 g/dL    RDW 13.9 11.5 - 14.5 %    PLATELET 078 609 - 898 K/uL    MPV 11.8 8.9 - 12.9 FL    NRBC 0.0 0  WBC    ABSOLUTE NRBC 0.00 0.00 - 0.01 K/uL    NEUTROPHILS 66 32 - 75 %    LYMPHOCYTES 27 12 - 49 %    MONOCYTES 6 5 - 13 %    EOSINOPHILS 1 0 - 7 %    BASOPHILS 0 0 - 1 %    IMMATURE GRANULOCYTES 0 0.0 - 0.5 %    ABS. NEUTROPHILS 6.7 1.8 - 8.0 K/UL    ABS. LYMPHOCYTES 2.8 0.8 - 3.5 K/UL    ABS. MONOCYTES 0.6 0.0 - 1.0 K/UL    ABS. EOSINOPHILS 0.1 0.0 - 0.4 K/UL    ABS. BASOPHILS 0.0 0.0 - 0.1 K/UL    ABS. IMM. GRANS. 0.0 0.00 - 0.04 K/UL    DF AUTOMATED     METABOLIC PANEL, COMPREHENSIVE    Collection Time: 02/09/21  5:06 PM   Result Value Ref Range    Sodium 138 136 - 145 mmol/L    Potassium 3.2 (L) 3.5 - 5.1 mmol/L    Chloride 106 97 - 108 mmol/L    CO2 21 21 - 32 mmol/L    Anion gap 11 5 - 15 mmol/L    Glucose 83 65 - 100 mg/dL    BUN 6 6 - 20 MG/DL    Creatinine 0.82 0.55 - 1.02 MG/DL    BUN/Creatinine ratio 7 (L) 12 - 20      GFR est AA >60 >60 ml/min/1.73m2    GFR est non-AA >60 >60 ml/min/1.73m2    Calcium 8.7 8.5 - 10.1 MG/DL    Bilirubin, total 0.4 0.2 - 1.0 MG/DL    ALT (SGPT) 24 12 - 78 U/L    AST (SGOT) 17 15 - 37 U/L    Alk. phosphatase 71 45 - 117 U/L    Protein, total 7.8 6.4 - 8.2 g/dL    Albumin 3.6 3.5 - 5.0 g/dL    Globulin 4.2 (H) 2.0 - 4.0 g/dL    A-G Ratio 0.9 (L) 1.1 - 2.2     SAMPLES BEING HELD    Collection Time: 02/09/21  5:06 PM   Result Value Ref Range    SAMPLES BEING HELD 1SST,1 DRK GRN,1BLUE     COMMENT        Add-on orders for these samples will be processed based on acceptable specimen integrity and analyte stability, which may vary by analyte.    BETA HCG, QT    Collection Time: 02/09/21  5:06 PM   Result Value Ref Range    Beta HCG, QT 52,549 (H) 0 - 6 MIU/ML   URINALYSIS W/ RFLX MICROSCOPIC    Collection Time: 02/09/21  9:29 PM   Result Value Ref Range    Color RED      Appearance CLOUDY (A) CLEAR      Specific gravity 1.021 1.003 - 1.030      pH (UA) 6.5 5.0 - 8.0 Protein 30 (A) NEG mg/dL    Glucose Negative NEG mg/dL    Ketone >80 (A) NEG mg/dL    Bilirubin Negative NEG      Blood LARGE (A) NEG      Urobilinogen 1.0 0.2 - 1.0 EU/dL    Nitrites Negative NEG      Leukocyte Esterase MODERATE (A) NEG      WBC 5-10 0 - 4 /hpf    RBC >100 (H) 0 - 5 /hpf    Epithelial cells FEW FEW /lpf    Bacteria Negative NEG /hpf    Hyaline cast 0-2 0 - 5 /lpf   SAMPLES BEING HELD    Collection Time: 21  9:29 PM   Result Value Ref Range    SAMPLES BEING HELD UC     COMMENT        Add-on orders for these samples will be processed based on acceptable specimen integrity and analyte stability, which may vary by analyte. HGB & HCT    Collection Time: 21  9:33 PM   Result Value Ref Range    HGB 9.8 (L) 11.5 - 16.0 g/dL    HCT 30.6 (L) 35.0 - 47.0 %   HGB & HCT    Collection Time: 02/10/21  3:32 AM   Result Value Ref Range    HGB 9.2 (L) 11.5 - 16.0 g/dL    HCT 28.6 (L) 35.0 - 47.0 %       Assessment and Plan:      Fabiola Gibbs is a 26 y.o.  at 13w1d by LMP and 12w2d by [address] [facility] [facility] admitted for subchorionic hemorrhage and threatened . Patient on admission with significant bleeding and blood clots, bleeding has since improved in the ED. Threatened : 2/2 to small subchorionic hemorrhage seen on US. Last HG.2 down from 9.8, s/p 1 L NS in the ED.  - Continue monitoring for bleeding.  - Trasfuse if Hgb <7  - Continue NPO for possible D&C if bleeding worsens. SIUP: at 12w2d viable seen on US. Bhc,382, complicated by subchorionic hemorrhage. Bedside US: + cardiac activity at 160, adequate fetal movement and fluid. - Continue expectant management  - Continue OB outpatient     Anemia: catherine 2/2 to bleeding since 01/15. POA: 11.9 today 9.2  - Start NV and PO iron  - Transfuse if Hgb <7      12:09 PM    Patient at this time continues to report weakness and lightheadedness. She reports bleeding is improved and less than a regular period.  Patient reports she feels OK to go home with elizabeth'r support at home. Plan:    - Start ferrous sulfate 325 mg TID. - Start Prenatal vitamins daily, can continue Vitamin D supplements. - Start Colace 100 mg tablet for constipation.  - Patient with follow up appointment tomorrow at [facility] with Dr. Cesar Tate.  - Return precautions given. Patient advise to return to ED if symptoms bleeding worsen. Patient to will be discussed with Dr. Gibson.        Signed By: Richardson Burden MD    Family Medicine Resident

## 2021-02-10 NOTE — ED NOTES
Spoke with [facility] Hospitalist regarding pts soft pressures. States to pull a H&H.  No fluid orders at this time

## 2021-02-10 NOTE — ETHICS
Clinical Ethics Consultation Note Ethics consulted 2/9 by Dr. Wilmer Escobar regarding potential future plan of care to include D&C. Ms. Alfredo Guerra is a 25 yo patient admitted for subchorionic hemorrhage. At time of admission, she passed a golf ball sized clot and was experiencing active bleeding. Fetal heart tones were also present on ultrasound. Per Dr. Wilmer Escobar, Ms. Lorenzo Alvarenga is currently stabilized following treatment of her subchorionic hemorrhage. However, there is risk of recurrent hemorrhage and need for future D&C to address and resolve source of bleeding and preserve Ms. Echevarria's health and well-being. Ethical and Quaker Directive 52: \"Operations, treatments, and medications that have as their direct purpose the cure of a proportionately serious pathological condition of a pregnant woman are permitted when they cannot be safely postponed until the unborn child is viable, even if they will result in the death of the unborn child. \" Recommendation: 
Should Ms. Lorenzo Alvarenga shows signs and symptoms of recurrent hemorrhage, D&C to address and resolve the source of life threatening hemorrhage would be appropriate as a cure for a present and proportionately serious pathological condition of Ms. Echevarria. Thank you for including Ethics in the care of Ms. Echevarria. For further questions or concerns, please contact me directly at [phone]. Jose Eduardo Huerta, PhD, [facility] Ethicist 
[phone]

## 2021-02-11 ENCOUNTER — VIRTUAL VISIT (OUTPATIENT)
Dept: FAMILY MEDICINE CLINIC | Age: 31
End: 2021-02-11

## 2021-02-11 DIAGNOSIS — O99.012 ANEMIA DURING PREGNANCY IN SECOND TRIMESTER: ICD-10-CM

## 2021-02-11 DIAGNOSIS — O20.0 THREATENED ABORTION: Primary | ICD-10-CM

## 2021-02-11 DIAGNOSIS — Z3A.13 13 WEEKS GESTATION OF PREGNANCY: ICD-10-CM

## 2021-02-11 PROBLEM — O46.8X1 SUBCHORIONIC HEMATOMA IN FIRST TRIMESTER: Status: ACTIVE | Noted: 2021-02-11

## 2021-02-11 PROBLEM — O41.8X10 SUBCHORIONIC HEMATOMA IN FIRST TRIMESTER: Status: ACTIVE | Noted: 2021-02-11

## 2021-02-11 PROCEDURE — 99443 PR PHYS/QHP TELEPHONE EVALUATION 21-30 MIN: CPT | Performed by: STUDENT IN AN ORGANIZED HEALTH CARE EDUCATION/TRAINING PROGRAM

## 2021-02-11 NOTE — PROGRESS NOTES
Joe Mcfarland  26 y.o. female  1990  [address]  [id number]   [address]:    Telemedicine Progress Note  Sj Garcia MD       Encounter Date and Time: February 11, 2021 at 4:13 PM    Consent: Joe Mcfarland, who was seen by synchronous (real-time) audio only technology, and/or her healthcare decision maker, is aware that this patient-initiated, Telehealth encounter on 2/11/2021 is a billable service, with coverage as determined by her insurance carrier. She is aware that she may receive a bill and has provided verbal consent to proceed: Yes. No chief complaint on file. History of Present Illness   Joe Mcfarland is a 26 y.o. female was evaluated by telephone from the office, through a secure patient portal.    Patient says she is feeling a little better today. She is still bleeding, but the bleeding is similar to a period and not so heavy. She has changed her pad three times today, just when she goes to the bathroom not because they are soaked. She is still cramping when she sits up but is spending all day lying down because she still feels very weak. She feels weaker and dizzy when sits up. She has been tolerating a regular diet and has been drinking juice and hydrating with water. Has been taking iron supplements. States that pregnancy was not planned but she would like to continue with the pregnancy. History of GDM or DM? no  History of GHTN or HTN? no- low blood   History of pre-eclampsia? no  Thyroid problems? No  Taking prenatal vitamins? yes  History of Sexual trauma? No    FOB was here but returned to [address] [place] [address] E 19 screen  Patient denies any fever, chills, congestion, sore throat, sick contacts, recent travel, muscle aches, diarrhea, loss of taste/smell, recent COVID testing. Was Covid positive in May, feels like she never really recovered- has felt fatigued with HA ever since.       Review of Systems Review of Systems   Constitutional: Positive for malaise/fatigue. HENT: Negative for sore throat. Respiratory: Negative for shortness of breath. Cardiovascular: Negative for chest pain. Gastrointestinal: Positive for abdominal pain. Genitourinary:        Vaginal bleeding   Neurological: Positive for weakness. Vitals/Objective:     General: fatigued, cooperative, no distress   Mental  status: mental status: alert, oriented to person, place, and time, normal mood, behavior, speech, dress, motor activity, and thought processes   Due to this being a TeleHealth evaluation, many elements of the physical examination are unable to be assessed. Assessment and Plan:   Time-based coding, delete if not needed: I spent at least 25 minutes with this established patient, and >50% of the time was spent counseling and/or coordinating care regarding recent hospitalization and OB history    Assessment/Plan:    1. Threatened  Now with concern for placental previa and placental abruption. Presented to ED for dizziness and fatigue since May when she had Covid. Patient had profuse vaginal bleeding in the ED waiting room, which continued for many hours associated with cramps and passing clots. TVUS at that time with living [address] [place] [address] and small subchorionic hemorrhage placenta too small to visualize location. Patient on elective bedrest, still feeling weak and fatigued. Still with slow bleeding, less heavy, now like a period.   - advised to drink lots of water  - call in 2 days to follow up bleeding  - ER return precautions  - continue pelvic rest   - tylenol for pain    2. 13 weeks gestation of pregnancy  - will f/u  for initial OB or sooner if bleeding does not resolve. - continue PNV    3.  Anemia of pregnancy Discharged from hospital with iron supplemnts  - Advised to continue iron TID    Time spent in direct conversation with the patient to include medical condition(s) discussed, assessment and treatment plan:       We discussed the expected course, resolution and complications of the diagnosis(es) in detail. Medication risks, benefits, costs, interactions, and alternatives were discussed as indicated. I advised her to contact the office if her condition worsens, changes or fails to improve as anticipated. She expressed understanding with the diagnosis(es) and plan. Patient understands that this encounter was a temporary measure, and the importance of further follow up and examination was emphasized. Patient verbalized understanding. Patient informed to follow up: in 10 days for initial OB or sooner if bleeding does not resolve. Electronically Signed: Eloina Damon MD    CPT Codes 66057-71559 for Established Patients may apply to this Telehealth Visit. POS code: 18. Modifier GT    Leia Nguyễn is a 26 y.o. female who was evaluated by an audio only encounter for concerns as above. Patient identification was verified prior to start of the visit. A caregiver was present when appropriate. Due to this being a TeleHealth encounter (During [address] emergency), evaluation of the following organ systems was limited: Vitals/Constitutional/EENT/Resp/CV/GI//MS/Neuro/Skin/Heme-Lymph-Imm. Pursuant to the emergency declaration under the [facility], [facility]5 waiver authority and the [employer] and [employer] Act, this Virtual Visit was conducted, with patient's (and/or legal guardian's) consent, to reduce the patient's risk of exposure to COVID-19 and provide necessary medical care. Services were provided through a synchronous discussion virtually to substitute for in-person clinic visit. I was in the office. The patient was at home. History   Patients past medical, surgical and family histories were reviewed and updated. No past medical history on file. No past surgical history on file.   No family history on file. Social History     Socioeconomic History    Marital status: SINGLE     Spouse name: Not on file    Number of children: Not on file    Years of education: Not on file    Highest education level: Not on file   Occupational History    Not on file   Social Needs    Financial resource strain: Not on file    Food insecurity     Worry: Not on file     Inability: Not on file    Transportation needs     Medical: Not on file     Non-medical: Not on file   Tobacco Use    Smoking status: Never Smoker    Smokeless tobacco: Never Used   Substance and Sexual Activity    Alcohol use: Not Currently    Drug use: Never    Sexual activity: Not on file   Lifestyle    Physical activity     Days per week: Not on file     Minutes per session: Not on file    Stress: Not on file   Relationships    Social connections     Talks on phone: Not on file     Gets together: Not on file     Attends Scientologist service: Not on file     Active member of club or organization: Not on file     Attends meetings of clubs or organizations: Not on file     Relationship status: Not on file    Intimate partner violence     Fear of current or ex partner: Not on file     Emotionally abused: Not on file     Physically abused: Not on file     Forced sexual activity: Not on file   Other Topics Concern    Not on file   Social History Narrative    Not on file     Patient Active Problem List   Diagnosis Code    Threatened  O20.0    13 weeks gestation of pregnancy Z3A.13    Subchorionic hematoma in first trimester O41.8X10, O55.8X3    Anemia during pregnancy in second trimester O99.012          Current Medications/Allergies   Medications and Allergies reviewed:    Current Outpatient Medications   Medication Sig Dispense Refill    docusate sodium (Colace) 100 mg capsule Take 1 Cap by mouth two (2) times a day for 90 days.  60 Cap 2    ferrous sulfate (IRON) 325 mg (65 mg iron) EC tablet Take 1 Tab by mouth three (3) times daily (with meals) for 30 days. 90 Tab 1    prenatal vit-iron fumarate-fa (PRENATAL PLUS with IRON) 28 mg iron- 800 mcg tab Take 1 Tab by mouth daily for 30 days. 30 Tab 2    ergocalciferol (ERGOCALCIFEROL) 1,250 mcg (50,000 unit) capsule Take 1 Cap by mouth every seven (7) days.  12 Cap 0     No Known Allergies

## 2021-02-22 ENCOUNTER — HOSPITAL ENCOUNTER (OUTPATIENT)
Dept: LAB | Age: 31
Discharge: HOME | End: 2021-02-22

## 2021-02-22 ENCOUNTER — ROUTINE PRENATAL (OUTPATIENT)
Dept: FAMILY MEDICINE CLINIC | Age: 31
End: 2021-02-22

## 2021-02-22 VITALS
WEIGHT: 148.4 LBS | OXYGEN SATURATION: 100 % | DIASTOLIC BLOOD PRESSURE: 66 MMHG | BODY MASS INDEX: 29.13 KG/M2 | SYSTOLIC BLOOD PRESSURE: 99 MMHG | TEMPERATURE: 97.1 F | HEART RATE: 84 BPM | RESPIRATION RATE: 18 BRPM | HEIGHT: 60 IN

## 2021-02-22 DIAGNOSIS — Z87.59 HISTORY OF PRETERM PREMATURE RUPTURE OF MEMBRANES (PPROM): ICD-10-CM

## 2021-02-22 DIAGNOSIS — O99.012 ANEMIA DURING PREGNANCY IN SECOND TRIMESTER: ICD-10-CM

## 2021-02-22 DIAGNOSIS — Z3A.14 14 WEEKS GESTATION OF PREGNANCY: ICD-10-CM

## 2021-02-22 DIAGNOSIS — Z3A.14 14 WEEKS GESTATION OF PREGNANCY: Primary | ICD-10-CM

## 2021-02-22 DIAGNOSIS — O26.899 DYSURIA DURING PREGNANCY, ANTEPARTUM: ICD-10-CM

## 2021-02-22 DIAGNOSIS — R30.0 DYSURIA DURING PREGNANCY, ANTEPARTUM: ICD-10-CM

## 2021-02-22 LAB
ABO + RH BLD: NORMAL
ANTIBODY SCREEN, EXTERNAL: NEGATIVE
BILIRUB UR QL STRIP: NEGATIVE
BLOOD BANK CMNT PATIENT-IMP: NORMAL
BLOOD BANK CMNT PATIENT-IMP: NORMAL
BLOOD GROUP ANTIBODIES SERPL: NORMAL
GLUCOSE UR-MCNC: NEGATIVE MG/DL
HBSAG, EXTERNAL: NEGATIVE
HIV, EXTERNAL: NORMAL
HIV, EXTERNAL: NORMAL
KETONES P FAST UR STRIP-MCNC: NORMAL MG/DL
PH UR STRIP: 5.5 [PH] (ref 4.6–8)
PROT UR QL STRIP: NORMAL
RPR, EXTERNAL: NORMAL
RUBELLA, EXTERNAL: NORMAL
SP GR UR STRIP: 1.03 (ref 1–1.03)
TYPE, ABO & RH, EXTERNAL: NORMAL
UA UROBILINOGEN AMB POC: NORMAL (ref 0.2–1)
URINALYSIS CLARITY POC: NORMAL
URINALYSIS COLOR POC: YELLOW
URINE BLOOD POC: NORMAL
URINE LEUKOCYTES POC: NORMAL
URINE NITRITES POC: NEGATIVE

## 2021-02-22 PROCEDURE — 88175 CYTOPATH C/V AUTO FLUID REDO: CPT

## 2021-02-22 PROCEDURE — 87624 HPV HI-RISK TYP POOLED RSLT: CPT

## 2021-02-22 PROCEDURE — 87625 HPV TYPES 16 & 18 ONLY: CPT

## 2021-02-22 PROCEDURE — 0500F INITIAL PRENATAL CARE VISIT: CPT | Performed by: STUDENT IN AN ORGANIZED HEALTH CARE EDUCATION/TRAINING PROGRAM

## 2021-02-22 PROCEDURE — 81003 URINALYSIS AUTO W/O SCOPE: CPT | Performed by: STUDENT IN AN ORGANIZED HEALTH CARE EDUCATION/TRAINING PROGRAM

## 2021-02-22 RX ORDER — CEPHALEXIN 250 MG/1
250 CAPSULE ORAL 4 TIMES DAILY
Qty: 28 CAP | Refills: 0 | Status: SHIPPED | OUTPATIENT
Start: 2021-02-22 | End: 2021-03-01

## 2021-02-22 NOTE — PATIENT INSTRUCTIONS
Semanas 10 a 14 de solano embarazo: Instrucciones de cuidado Weeks 10 to 14 of Your Pregnancy: Care Instructions Instrucciones de cuidado Para las semanas 10 a 14 de solano embarazo, la placenta se ha formado dentro del útero. Es posible oír los latidos del corazón de solano bebé con un instrumento especial de ultrasonido. Los ojos de solano bebé pueden moverse y lo Luceni. Los brazos y las piernas se pueden flexionar. Judy es un buen momento para pensar en las pruebas para detectar defectos congénitos. Existen dos tipos de pruebas: de detección y de diagnóstico. Las pruebas de detección muestran la posibilidad de que un bebé tenga un determinado defecto congénito. No pueden decirle con seguridad que solano bebé tiene un problema. Las pruebas de diagnóstico muestran si un bebé tiene un determinado defecto congénito. Es solano decisión si desea hacerse estas pruebas. Usted y solano vikcey pueden hablar con solano médico o partera sobre las pruebas de defectos congénitos. La atención de seguimiento es raymond parte clave de solano tratamiento y seguridad. Asegúrese de hacer y acudir a todas las citas, y llame a solano médico si está teniendo problemas. También es raymond buena idea saber los resultados de edyta exámenes y mantener raymond lista de los medicamentos que arnulfo. Cómo puede cuidarse en el hogar? Parisa Davise · Puede hacerse pruebas de detección y pruebas de diagnóstico para detectar defectos congénitos. La decisión de [employer] prueba para detectar defectos congénitos es personal. Considere solano edad, solano probabilidad de transmitir raymond enfermedad hereditaria, solano necesidad de saber acerca de cualquier problema y lo que podría hacer después de tener los Huxley de la prueba. ? Análisis de vicky triple o cuádruple. Estas pruebas de detección se pueden hacer entre las semanas 15 y 21 del [facility]. [address] cantidades de sherwin o cuatro sustancias en la vicky. El médico observa estos resultados de la prueba, junto con solano edad y otros factores, para averiguar la probabilidad de que solano bebé pueda tener ciertos problemas. ? Amniocentesis. Esta prueba de diagnóstico se utiliza para detectar problemas cromosómicos en las células del bebé. Se puede hacer [employer] 15 y 21 del [facility], por lo general alrededor de la semana 16. 
? Prueba de translucencia nucal. Esta prueba [place] ultrasonido para medir el grosor de la natalie de la nuca del bebé. Un aumento en el grosor puede ser raymond señal temprana del síndrome de Down. 
? Muestra de vellosidades coriónicas ([facility], por edyta siglas en inglés). Esta es raymond prueba que detecta determinados problemas genéticos del bebé. Los mismos genes que tiene solano bebé están en la placenta. Se extrae y se examina un pequeño pedazo de placenta. Esta prueba se realiza cuando usted [employer] semana 10 y 15 de solano embarazo. Alivie la falta de comodidad · Cálmese y duerma siestas cuando se sienta cansada. · Si edyta emociones varían, hable con alguien. El llanto, la ansiedad y los problemas de concentración son comunes. · Si le sangran las encías, pruebe usar un cepillo de dientes más [place]. Si tiene las [employer] o estas le sangran mucho, consulte con solano dentista. · Si tiene mareos: 
? Levántese despacio después de estar sentada o acostada. ? Akila abundantes líquidos. ? Coma pequeños refrigerios para mantener estable el azúcar en vicky. ? Coloque la Moy Naldo edyta piernas omer si estuviera atándose los cordones (agujetas). ? Acuéstese con las piernas más arriba que solano juan manuel. Use almohadas para apoyar los pies. · Si tiene dolor de juan manuel: ? Acuéstese. ? Pídale a solano vickey o a un amigo que le katty un masaje en el yin. ? Colóquese paños fríos sobre la frente o en la nuca. ? Use acetaminofén (Tylenol) para aliviar el dolor. No tome antiinflamatorios no esteroideos (CRYSTAL), tales omer ibuprofeno (Advil, Motrin) o naproxeno (Aleve), a menos que solano médico le diga que puede [place]. · Si le sangra la Doristine Lessen, apriétesela con suavidad y manténgala apretada por un rato. Para evitar sangrados nasales, pruebe hacerse masajes en las fosas nasales con raymond cantidad pequeña de vaselina. · Si tiene la nariz congestionada, pruebe con un aerosol nasal salino (agua salada). No utilice aerosoles descongestionantes. Cuídese los senos · Use un sostén que le dé buen soporte. · Sepa que los cambios en los senos son normales. ? [place] senos pueden aumentar de [employer] y [employer]'GOBA. El aumento de la sensibilidad suele mejorar a las 12 semanas. ? [place] pezones pueden oscurecerse y agrandarse, y es posible que las pequeñas protuberancias (abultamientos) alrededor de ellos amelia más visibles. ? Las venas del pecho y de los senos podrían ser Marcia Holy. · No se preocupe por endurecer los pezones. El amamantamiento hará esto naturalmente. Dónde puede encontrar más información en inglés? Saida Hyatt a http://www.gray.com/ [place] J406 en la búsqueda para aprender más acerca de \"Semanas 10 a 14 de solano embarazo: Instrucciones de cuidado. \" Revisado: 11 febrero, 2020               Versión del contenido: 12.6 © 2526-8458 Healthwise, Incorporated. Las instrucciones de cuidado fueron adaptadas bajo licencia por [employer] (which disclaims liability or warranty for this information). Si usted tiene [place] Junior afección médica o sobre estas instrucciones, siempre pregunte a solano profesional de armaan. [facility] niega toda garantía o responsabilidad por solano uso de esta información.

## 2021-02-22 NOTE — PROGRESS NOTES
I reviewed with the resident the medical history and the resident's findings on the physical examination. I discussed with the resident the patient's diagnosis and concur with the plan. VB in hospital earlier in month, [facility] on TVUS and viable IUP  Very small amount of spotting now   Hx PTD after PPROM 34wk in [place]    29yo  @ 14w6d by LMP c/w 12wk sono   1. IUP: IOB labs today, NIPT today  2.   Hx PTD: records unavailable

## 2021-02-22 NOTE — PROGRESS NOTES
Subjective:   Christopher Salinas is a  26 y.o. Yani Sreedhar who is being seen today for her first obstetrical visit. [facility] interp used due to language barrier. This is not a planned pregnancy. FOB is her ex-boyfriend. He is now back in [place], but may return to the [facility] to help her care for the baby. She is at 14w6d by LMP (11/10) c/w 12w US. Of note she was admitted to the hospital from  to 2/10 for a threatened . VB was thought to be 2/2 to small subchorionic hemorrhage seen on US. Hb.2--->9.5 on discharge. Currently she reports VB only when wiping with urination or if she is on her feet a lot. Amount of VB is minimal. She denies abdominal pain. Her only other complaint today is dysuria for about 7-8 days. [id number]  · 1st pregnancy - Delivery method: Vaginal (in [place]). Fetal weight: ~5 pounds. Complications: , water broke at [id number] History of GDM or DM? No    History of GHTN or HTN? No    History of pre-eclampsia? No    She is taking prenatal vitamins. Desires first trimester dating ultrasound? Already done on     Desires genetic screening for fetal anomalies?: Yes    Desires second trimester fetal anatomy ultrasound? Yes      Allergies- reviewed:   No Known Allergies    Medications- reviewed:   Current Outpatient Medications   Medication Sig    cephALEXin (KEFLEX) 250 mg capsule Take 1 Cap by mouth four (4) times daily for 7 days.  docusate sodium (Colace) 100 mg capsule Take 1 Cap by mouth two (2) times a day for 90 days.  ferrous sulfate (IRON) 325 mg (65 mg iron) EC tablet Take 1 Tab by mouth three (3) times daily (with meals) for 30 days.  prenatal vit-iron fumarate-fa (PRENATAL PLUS with IRON) 28 mg iron- 800 mcg tab Take 1 Tab by mouth daily for 30 days.  ergocalciferol (ERGOCALCIFEROL) 1,250 mcg (50,000 unit) capsule Take 1 Cap by mouth every seven (7) days. No current facility-administered medications for this visit. Past Medical History- reviewed:  Past Medical History:   Diagnosis Date    Anemia      delivery          Past Surgical History- reviewed:   History reviewed. No pertinent surgical history.       Social History- reviewed:  Social History     Socioeconomic History    Marital status: SINGLE     Spouse name: Not on file    Number of children: Not on file    Years of education: Not on file    Highest education level: Not on file   Occupational History    Not on file   Social Needs    Financial resource strain: Not on file    Food insecurity     Worry: Not on file     Inability: Not on file    Transportation needs     Medical: Not on file     Non-medical: Not on file   Tobacco Use    Smoking status: Never Smoker    Smokeless tobacco: Never Used   Substance and Sexual Activity    Alcohol use: Not Currently    Drug use: Never    Sexual activity: Yes     Partners: Male   Lifestyle    Physical activity     Days per week: Not on file     Minutes per session: Not on file    Stress: Not on file   Relationships    Social connections     Talks on phone: Not on file     Gets together: Not on file     Attends Shinto service: Not on file     Active member of club or organization: Not on file     Attends meetings of clubs or organizations: Not on file     Relationship status: Not on file    Intimate partner violence     Fear of current or ex partner: Not on file     Emotionally abused: Not on file     Physically abused: Not on file     Forced sexual activity: Not on file   Other Topics Concern    Not on file   Social History Narrative    Not on file       Immunizations- reviewed:   Immunization History   Administered Date(s) Administered    Influenza Vaccine Crunchbutton) PF (>6 Mo Flulaval, Fluarix, and [facility]) 2021     Tdap: Not yet completed       ROS  : Denies leaking of fluid and reports minimal VB, less than previously   GI: Denies nausea and vomiting      Objective: Visit Vitals  BP 99/66 (BP 1 Location: Left upper arm, BP Patient Position: Sitting)   Pulse 84   Temp 97.1 °F (36.2 °C) (Temporal)   Resp 18   Ht 5' 0.04\" (1.525 m)   Wt 148 lb 6.4 oz (67.3 kg)   LMP 11/10/2020 (Exact Date) Comment: pt thought she misscarried in January   SpO2 100%   BMI 28.94 kg/m²     Physical Exam:  GENERAL APPEARANCE: alert, well appearing, in no apparent distress  HEAD: normocephalic, atraumatic   LUNGS: clear to auscultation, no wheezes, rales or rhonchi, symmetric air entry  HEART: regular rate and rhythm, no murmurs  ABDOMEN: FHT present at 140  BACK: no CVA tenderness  EXTERNAL GENITALIA: normal appearing vulva with no masses, tenderness or lesions  VAGINA: no abnormal discharge or lesions  CERVIX: no lesions or cervical motion tenderness. Scant VB at os only noted when collecting endocervical sample  UTERUS: gravid  ADNEXA: no masses palpable and nontender  EXTREMITIES: no redness or tenderness in the calves or thighs, no edema  NEUROLOGICAL: alert, oriented, normal speech, no focal findings or movement disorder noted  SKIN: normal coloration and turgor, no rashes    Exam chaperoned by Saroj Ingram LPN    Labs:  Recent Results (from the past 12 hour(s))   AMB POC URINALYSIS DIP STICK AUTO W/O MICRO    Collection Time: 21  8:34 AM   Result Value Ref Range    Color (UA POC) Yellow     Clarity (UA POC) Slightly Cloudy     Glucose (UA POC) Negative Negative    Bilirubin (UA POC) Negative Negative    Ketones (UA POC) Trace Negative    Specific gravity (UA POC) 1.030 1.001 - 1.035    Blood (UA POC) 2+ Negative    pH (UA POC) 5.5 4.6 - 8.0    Protein (UA POC) 1+ Negative    Urobilinogen (UA POC) 0.2 mg/dL 0.2 - 1    Nitrites (UA POC) Negative Negative    Leukocyte esterase (UA POC) 1+ Negative         Assessment:     31yo female with SIUP at 14w6d by LMP c/w 99LE US, complicated by threatened  2 weeks ago. VB thought to be 2/2 small subchorionic hemorrhage on US. VB now scant. Patient here for initial OB visit, with stable vitals and FHT's present in the 140s. ICD-10-CM ICD-9-CM    1. 14 weeks gestation of pregnancy  Z3A.14 V22.2 CBC WITH AUTOMATED DIFF      CHLAMYDIA/GC PCR      RUBELLA AB, IGG      RPR      VZV AB, IGG      ANTIBODY SCREEN      HIV 1/2 AG/AB, 4TH GENERATION,W RFLX CONFIRM      BLOOD TYPE, (ABO+RH)      HEMOGLOBIN FRACTIONATION      HEP B SURFACE AG      CULTURE, URINE      PAP IG, APTIMA HPV AND RFX 16/18,45 ([id number])      AMB POC URINALYSIS DIP STICK AUTO W/O MICRO   2. Dysuria during pregnancy, antepartum  O26.899 646.83 cephALEXin (KEFLEX) 250 mg capsule    R30.0 788.1    3. History of  premature rupture of membranes (PPROM)  Z87.59 V13.29    4. Anemia during pregnancy in second trimester  O99.012 648.23          Plan:   · Initial labs/specimens collected (CBC, blood type & screen, Rh antibody screen, rubella titer, HBsAg titer, RPR, HIV, UA w/ cx, pap smear, gonorrhea/chlamydia cx)  · NIPT to be drawn today for genetic screening   · Continue prenatal vitamins  · Hx of PPROM: Discussed risks vs. Benefits of [place] injections. Patient interested in starting injections for hx of  delivery, first injection scheduled for 2 weeks from now. Counseled on importance of NOT missing [place] injections. Also discussed screening for shortened cervix. Patient agreeable to set up [place] with MFM to check her cervical length. Deshaun Grooms faxed form in order to schedule this. · MFM to schedule 20 week anatomy scan after they see patient for cervical length screening  · UTI: Patient with dysuria and UA 1+ LE, neg nitrites. Will send ur cx (if pos, will need LINCOLN). Start Keflex x7 days  · Threatened : at 12w2d 2/2 small subchorionic hemorrhage seen on US. Last Hg 9.5 on 2/10, CBC today  · Anemia of Preg: Likely 2/2 subchorionic hem (see above).  Continue iron TID   · Follow-up in 2 week(s) for 1st [place] injection and 4 weeks for return OB      Discussed the patient's BMI with her. The BMI follow up plan is as follows: Recommended 15-25lb weight gain during pregnancy     The patient was counseled on the dangers of tobacco and alcohol use and advised to continue avoidance. Orders Placed This Encounter    CHLAMYDIA/GC PCR     Standing Status:   Future     Number of Occurrences:   1     Standing Expiration Date:   2/22/2022     Order Specific Question:   Sample source     Answer:   Urine [258]     Order Specific Question:   Specimen source     Answer:   Urine [258]    CULTURE, URINE     Standing Status:   Future     Number of Occurrences:   1     Standing Expiration Date:   2/22/2022    CBC WITH AUTOMATED DIFF     Standing Status:   Future     Number of Occurrences:   1     Standing Expiration Date:   5/22/2021    RUBELLA AB, IGG     Standing Status:   Future     Number of Occurrences:   1     Standing Expiration Date:   2/22/2022    RPR     Standing Status:   Future     Number of Occurrences:   1     Standing Expiration Date:   2/22/2022    VZV AB, IGG     Standing Status:   Future     Number of Occurrences:   1     Standing Expiration Date:   2/22/2022    HIV 1/2 AG/AB, 4TH GENERATION,W RFLX CONFIRM     Standing Status:   Future     Number of Occurrences:   1     Standing Expiration Date:   2/22/2022    HEMOGLOBIN FRACTIONATION     Standing Status:   Future     Number of Occurrences:   1     Standing Expiration Date:   2/22/2022    HEP B SURFACE AG     Standing Status:   Future     Number of Occurrences:   1     Standing Expiration Date:   2/22/2022    AMB POC URINALYSIS DIP STICK AUTO W/O MICRO    ANTIBODY SCREEN     Standing Status:   Future     Number of Occurrences:   1     Standing Expiration Date:   2/22/2022    BLOOD TYPE, (ABO+RH)     Standing Status:   Future     Number of Occurrences:   1     Standing Expiration Date:   2/22/2022    cephALEXin (KEFLEX) 250 mg capsule     Sig: Take 1 Cap by mouth four (4) times daily for 7 days.      Dispense:  28 Cap Refill:  0     Patient is Malawian speaking    PAP IG, APTIMA HPV AND RFX [id number] ([id number])     Standing Status:   Future     Number of Occurrences:   1     Standing Expiration Date:   2/22/2022     Order Specific Question:   Pap Source? Answer:   Endocervical     Order Specific Question:   Total Hysterectomy? Answer:   No     Order Specific Question:   Supracervical Hysterectomy? Answer:   No     Order Specific Question:   Post Menopausal?     Answer:   No     Order Specific Question:   Hormone Therapy? Answer:   No     Order Specific Question:   IUD? Answer:   No     Order Specific Question:   Abnormal Bleeding? Answer:   No     Order Specific Question:   Pregnant     Answer:   Yes     Order Specific Question:   Post Partum? Answer:   No         I have discussed the diagnosis with the patient and the intended plan as seen in the above orders. The patient verbalized understanding of the plan and agrees with the plan. The patient has received an after-visit summary and questions were answered concerning future plans. I have discussed medication side effects and warnings with the patient as well. Informed pt to return to the office or go to the ER if she experiences vaginal bleeding, vaginal discharge, leaking of fluid, pelvic cramping.       Pt seen and discussed with Selene (attending physician)      Stefano Quintanilla, DO  Family Medicine Resident

## 2021-02-22 NOTE — PROGRESS NOTES
Chief Complaint   Patient presents with    Initial Prenatal Visit     1. Have you been to the ER, urgent care clinic since your last visit? Hospitalized since your last visit? No    2. Have you seen or consulted any other health care providers outside of the [address] [place] [address] since your last visit? Include any pap smears or colon screening. No     Reviewed record in preparation for visit and have obtained necessary documentation.

## 2021-02-23 LAB
BASOPHILS # BLD: 0 K/UL (ref 0–0.1)
BASOPHILS NFR BLD: 1 % (ref 0–1)
DIFFERENTIAL METHOD BLD: ABNORMAL
EOSINOPHIL # BLD: 0.1 K/UL (ref 0–0.4)
EOSINOPHIL NFR BLD: 2 % (ref 0–7)
ERYTHROCYTE [DISTWIDTH] IN BLOOD BY AUTOMATED COUNT: 15.6 % (ref 11.5–14.5)
HBV SURFACE AG SER QL: <0.1 INDEX
HBV SURFACE AG SER QL: NEGATIVE
HCT VFR BLD AUTO: 32.9 % (ref 35–47)
HGB BLD-MCNC: 9.9 G/DL (ref 11.5–16)
HIV 1+2 AB+HIV1 P24 AG SERPL QL IA: NONREACTIVE
HIV12 RESULT COMMENT, HHIVC: NORMAL
IMM GRANULOCYTES # BLD AUTO: 0 K/UL (ref 0–0.04)
IMM GRANULOCYTES NFR BLD AUTO: 0 % (ref 0–0.5)
LYMPHOCYTES # BLD: 1.3 K/UL (ref 0.8–3.5)
LYMPHOCYTES NFR BLD: 18 % (ref 12–49)
MCH RBC QN AUTO: 25.3 PG (ref 26–34)
MCHC RBC AUTO-ENTMCNC: 30.1 G/DL (ref 30–36.5)
MCV RBC AUTO: 83.9 FL (ref 80–99)
MONOCYTES # BLD: 0.5 K/UL (ref 0–1)
MONOCYTES NFR BLD: 6 % (ref 5–13)
NEUTS SEG # BLD: 5.6 K/UL (ref 1.8–8)
NEUTS SEG NFR BLD: 73 % (ref 32–75)
NRBC # BLD: 0 K/UL (ref 0–0.01)
NRBC BLD-RTO: 0 PER 100 WBC
PLATELET # BLD AUTO: 301 K/UL (ref 150–400)
PMV BLD AUTO: 12.5 FL (ref 8.9–12.9)
RBC # BLD AUTO: 3.92 M/UL (ref 3.8–5.2)
RPR SER QL: NONREACTIVE
RUBV IGG SER-IMP: REACTIVE
RUBV IGG SERPL IA-ACNC: >500 IU/ML
VZV IGG SER IA-ACNC: 705 INDEX
WBC # BLD AUTO: 7.6 K/UL (ref 3.6–11)

## 2021-02-24 LAB
BACTERIA SPEC CULT: NORMAL
C TRACH DNA SPEC QL NAA+PROBE: NEGATIVE
DEPRECATED HGB OTHER BLD-IMP: 0 %
HGB A MFR BLD: 97.8 % (ref 96.4–98.8)
HGB A2 MFR BLD COLUMN CHROM: 2.2 % (ref 1.8–3.2)
HGB C MFR BLD: 0 %
HGB F MFR BLD: 0 % (ref 0–2)
HGB FRACT BLD-IMP: NORMAL
HGB S BLD QL SOLY: NEGATIVE
HGB S MFR BLD: 0 %
N GONORRHOEA DNA SPEC QL NAA+PROBE: NEGATIVE
SAMPLE TYPE: NORMAL
SERVICE CMNT-IMP: NORMAL
SERVICE CMNT-IMP: NORMAL
SPECIMEN SOURCE: NORMAL

## 2021-03-01 NOTE — PROGRESS NOTES
PNL: B+, antibody screen neg, Hg fractionation normal, Varicella and rubella immune, RPR/HIV NR, hep B neg, GC/chlamydia neg, ur cx no growth, Hg 9.9 (already on iron TID)

## 2021-03-02 ENCOUNTER — TELEPHONE (OUTPATIENT)
Dept: FAMILY MEDICINE CLINIC | Age: 31
End: 2021-03-02

## 2021-03-02 ENCOUNTER — HOSPITAL ENCOUNTER (OUTPATIENT)
Dept: PERINATAL CARE | Age: 31
Discharge: HOME | End: 2021-03-02
Attending: OBSTETRICS & GYNECOLOGY

## 2021-03-02 PROCEDURE — 76817 TRANSVAGINAL US OBSTETRIC: CPT | Performed by: OBSTETRICS & GYNECOLOGY

## 2021-03-02 PROCEDURE — 76816 OB US FOLLOW-UP PER FETUS: CPT | Performed by: OBSTETRICS & GYNECOLOGY

## 2021-03-02 NOTE — TELEPHONE ENCOUNTER
[facility] pharmacy calling to set up [facility] shipment. Will be delivered to office on 3/4/21. Will call patient when delivered to start weekly injections.

## 2021-03-02 NOTE — TELEPHONE ENCOUNTER
Per call from pt's friend, states that pt is Turkmen speaking in she's helping out. Notes that they are at [facility] for Ultrasound in not sure where to go. Asked if there was someone at  to inquire? She states she spoke to a \"nurse\" whom states she didn't know. Placed pt on hold to ask for directions for pt; when I came back to the line she states a gentlemen was able to point them to location for [facility], [facility]..

## 2021-03-04 ENCOUNTER — TELEPHONE (OUTPATIENT)
Dept: FAMILY MEDICINE CLINIC | Age: 31
End: 2021-03-04

## 2021-03-04 NOTE — TELEPHONE ENCOUNTER
----- Message from Rosaline Cookny sent at 2/26/2021  2:29 PM EST -----  Regarding: Dr. Larisa Baker  General Message/Vendor Calls    Caller's first and last name: Ranjan Harley with [address] [place]       Reason for call: To schedule delivery of Rx       Callback required yes/no and why: Yes      Best contact number(s): [phone]      Details to clarify the request: Pt was approved for Rx Rachelle and they would like to deliver it to the office for pt pickup. Please call to confirm.        Tiara L Toussaint

## 2021-03-06 NOTE — PROGRESS NOTES
Return OB Visit     Subjective:   Crispin Cardenas is a 26 y.o.  at 16w6d by LMP c/w 12k ultrasound   Estimated Date of Delivery: 21  Intpretor #[id number]    Pregnancy complicated by threatened  at 12wks, small subchorionic hemorrhage, anemia of pregnancy and history of  delivery at 34wk. LOF: no   Vaginal bleeding: none. Patient had some brown discharge ten days ago but nothing since. Contractions: no  Dysuria: no  Taking prenatal vitamins: yes   Taking iron: Yes, three times a day. Also taking her stool softener so does not endorse constipation today. Concerns today: None. She is declining a [place] injection because she states that her ultrasound and cervical check were normal. She would prefer not to do the injections. Allergies   No Known Allergies     Medications:   Current Outpatient Medications   Medication Sig    docusate sodium (Colace) 100 mg capsule Take 1 Cap by mouth two (2) times a day for 90 days.  ferrous sulfate (IRON) 325 mg (65 mg iron) EC tablet Take 1 Tab by mouth three (3) times daily (with meals) for 30 days.  prenatal vit-iron fumarate-fa (PRENATAL PLUS with IRON) 28 mg iron- 800 mcg tab Take 1 Tab by mouth daily for 30 days.  ergocalciferol (ERGOCALCIFEROL) 1,250 mcg (50,000 unit) capsule Take 1 Cap by mouth every seven (7) days. No current facility-administered medications for this visit. Past Medical History:  Past Medical History:   Diagnosis Date    Anemia      delivery      Past Surgical History:   No past surgical history on file.      Social History:  Social History     Tobacco Use    Smoking status: Never Smoker    Smokeless tobacco: Never Used   Substance Use Topics    Alcohol use: Not Currently    Drug use: Never     Immunizations:   Immunization History   Administered Date(s) Administered    Influenza Vaccine [employer]) PF (>6 Mo Flulaval, Fluarix, and >3 Yrs [facility]) 2021 Objective     Visit Vitals  /66 (BP 1 Location: Left upper arm, BP Patient Position: Sitting)   Pulse 99   Temp 97.1 °F (36.2 °C) (Temporal)   Resp 18   Ht 5' (1.524 m)   Wt 151 lb 3.2 oz (68.6 kg)   LMP 11/10/2020 (Exact Date) Comment: pt thought she misscarried in January   SpO2 99%   BMI 29.53 kg/m²     Physical Exam  GENERAL APPEARANCE: alert, well appearing, in no apparent distress  ABDOMEN: gravid, FHT present at 140 bpm  PSYCH: normal mood and affect    Labs  No results found for this or any previous visit (from the past 12 hour(s)). Assessment   Valentino Mesi is a 26 y.o.  at 16w6d by 12wk ultrasound here for a return OB visit. Estimated Date of Delivery: 21   Plan   IUP   · IOB labs: B+, Ab screen neg, HIV NR, Hep B neg, CBC w/Hb 9.9, VZV immune, RPR NR, Rubella  reactive, Hbg fract nml, urine cx no growth, GC/CT neg, pap NILM, HPV+ (HR neg). Per ASSCP guidelines, will need repeat cotesting in 1 year   · Genetic Screening: form completed today. · S/p Flu vaccine 2021  · Anatomy scan with MFM (20 wk): 3/30 at 11am     Other  · Hx of PPROM: Declining Rachelle injections today despite counseling. MFM scan at [facility] () notes normal cervical length (38mm), return in 4 weeks for anatomy scan scheduled on  at 11am   · UTI: () Patient with dysuria and UA 1+ LE, neg nitrites. UCx no growth, treated with Keflex x7 days  · Threatened : at 12w2d 2/2 small subchorionic hemorrhage seen on US. Last Hg 9.9 ()   · Anemia of Preg: Likely 2/2 subchorionic hem (see above). Continue iron TID     Labor precautions discussed, including: Regular painful contractions, lasting for greater than one hour, taking your breath away; any vaginal bleeding; any leakage of fluid; or absent or decreased fetal movement. Call M.D. on call if any of these symptoms or signs occur.     I have discussed the diagnosis with the patient and the intended plan as seen in the above orders. The patient has received an after-visit summary and questions were answered concerning future plans. I have discussed medication side effects and warnings with the patient as well. Informed pt to return to the office or go to the ER if she experiences vaginal bleeding, vaginal discharge, leaking of fluid, pelvic cramping.     Patient discussed with Dr. Sheba Apodaca (Attending Physician)    Tony Duverney, MD  Family Medicine Resident

## 2021-03-08 ENCOUNTER — ROUTINE PRENATAL (OUTPATIENT)
Dept: FAMILY MEDICINE CLINIC | Age: 31
End: 2021-03-08

## 2021-03-08 VITALS
WEIGHT: 151.2 LBS | BODY MASS INDEX: 29.68 KG/M2 | OXYGEN SATURATION: 99 % | TEMPERATURE: 97.1 F | HEART RATE: 99 BPM | SYSTOLIC BLOOD PRESSURE: 102 MMHG | HEIGHT: 60 IN | RESPIRATION RATE: 18 BRPM | DIASTOLIC BLOOD PRESSURE: 66 MMHG

## 2021-03-08 DIAGNOSIS — Z3A.17 17 WEEKS GESTATION OF PREGNANCY: Primary | ICD-10-CM

## 2021-03-08 PROCEDURE — 0502F SUBSEQUENT PRENATAL CARE: CPT | Performed by: STUDENT IN AN ORGANIZED HEALTH CARE EDUCATION/TRAINING PROGRAM

## 2021-03-08 RX ORDER — SWAB
1 SWAB, NON-MEDICATED MISCELLANEOUS DAILY
Qty: 30 TAB | Refills: 5 | Status: SHIPPED | OUTPATIENT
Start: 2021-03-08 | End: 2021-04-05 | Stop reason: SDUPTHER

## 2021-03-08 NOTE — PROGRESS NOTES
Chief Complaint   Patient presents with    Routine Prenatal Visit     16 weeks 6 days, Gail     1. Have you been to the ER, urgent care clinic since your last visit? Hospitalized since your last visit? No    2. Have you seen or consulted any other health care providers outside of the [address] [place] [address] since your last visit? Include any pap smears or colon screening. No     Reviewed record in preparation for visit and have obtained necessary documentation. Patient denies have any bleeding,cramping ,spotting or leaking of fluid.

## 2021-03-08 NOTE — PROGRESS NOTES
I reviewed with the resident the medical history and the resident's findings on the physical examination. I discussed with the resident the patient's diagnosis and concur with the plan. 29yo  @ 16w6d by LMP c/w 12wk semajo   1. IUP: RH pos, CFFDNA wnl   2. Hx PTD: after PPROM in [facility] at [address], records unavailable, declined [place] after further discussion, is getting CLS with MFM and last was ok  3. HPV pos pap: repeat pap in a year   4. Alpha thal silent carrier: on Horizon, offered testing for FOB but he does not live in this country and will try to get tested where he is.

## 2021-03-08 NOTE — PATIENT INSTRUCTIONS
Semanas 14 a 18 de solano embarazo: Instrucciones de cuidado  Weeks 14 to 18 of Your Pregnancy: Care Instructions  Instrucciones de cuidado    Fogd [address], es posible que se le empiece a notar que está [place]. También podría observar algunos cambios en la piel, omer picazón en algunas zonas de las otis de las rian o acné en la jose d. Beverly Rumpf, solano bebé puede orinar y edyta primeras heces (meconio) comienzan a acumularse en el intestino. Gina Pih a crecerle el elmira en la juan manuel. En solano próxima visita, [employer] 18 y 21, solano médico podría hacerle raymond ecografía. La prueba le permite al médico verificar si hay ciertos problemas. Solano médico también puede determinar el sexo de solano bebé. Judy es un buen momento para pensar si Bahman Lease si solano bebé es Salome Blake. Hable con solano médico acerca de ponerse la vacuna contra la gripe para ayudar a mantenerse vinay josee el embarazo. Con el transcurrir del [facility], es común sentirse preocupada o ansiosa. Solano cuerpo está [employer] Y usted está pensando en joaquin a sugar, en la armaan de solano bebé y en convertirse en madre. Puede aprender a sobrellevar la ansiedad y el estrés que siente. La atención de seguimiento es raymond parte clave de solano tratamiento y seguridad. Asegúrese de hacer y acudir a todas las citas, y llame a solano médico si está teniendo problemas. También es raymond buena idea saber los resultados de edyta exámenes y mantener raymond lista de los medicamentos que arnulfo. ¿Cómo puede cuidarse en el hogar? Reduzca el estrés    · Pida ayuda para cocinar y hacer los quehaceres domésticos.     · Entienda quién o qué le provoca estrés. Evite a estas personas o situaciones tanto omer le sea posible.     · Relájese todos los días. Tomarse descansos de 10 a 15 minutos puede hacerle sentir raymond gran diferencia. Camine, escuche música o tome un baño tibio.     · [place] clases de yoga o educación prenatal para aprender técnicas de relajación.  También puede comprar un disco compacto de relajación.     · German raymond lista de edyta temores acerca de tener el bebé y ser [place]. Comparta la lista con alguien de solano confianza. Myrna Parikh inquietudes son verdaderamente pequeñas, y trate de deshacerse de ellas. Ejercicio    · Si no hizo mucho ejercicio antes del embarazo, comience poco a poco. Lo mejor es caminar. Regule solano ritmo y german un poco más cada día.     · La caminata rápida, el trote lento, los ejercicios aeróbicos de bajo impacto, los ejercicios aeróbicos en el agua y el yoga son Stacey Crystal opciones. Algunos deportes, omer el buceo, la equitación, el esquí Rosey, la gimnasia y el esquí acuático no son Maximiano Conquest idea.     · Trate de hacer por lo menos 2½ horas de ejercicio moderado a la semana, omer, por ejemplo, raymond caminata rápida. Anitha Latter-day de hacer esto es estar activo 30 minutos al día, por lo menos 5 días de la Okay. Está jon estar activo en bloques de 10 minutos o más josee el día y la semana.     · Use ropa holgada. Use zapatos y un sostén que le proporcionen un buen soporte.     · Oley Jasmine de calentamiento y enfriamiento para comenzar y finalizar edyta ejercicios.     · Si desea usar pesas, asegúrese de que amelia livianas. Estas reducen la tensión en las articulaciones. Manténgase en el peso ideal para usted    · Los expertos recomiendan el aumento de 1 matty (medio kilo) al mes josee los 3 primeros meses del embarazo.     · También recomiendan aumentar 1 matty a la [employer] 6 últimos meses del [facility], para aumentar de 25 a 35 libras (11 a 16 kg) en total.     · Si está por debajo del peso recomendable para usted, necesitará aumentar más, de 28 a 40 libras (13 a 18 kg) aproximadamente.     · Si tiene sobrepeso, quizás no deba aumentar tanto de [place], de 15 a 25 libras (7 a 11 kg) aproximadamente.     · Si está subiendo de Marky Kennedy, use solano sentido común. Edd Jasmine [employer], y SeeMe, la comida rápida y [place].  Bethany Litter vandana magras, frutas y verduras.     · Si va a tener gemelos o más bebés, es posible que solano médico la remita a un dietista. ¿Dónde puede encontrar más información en inglés? Vaya a http://www.NextCare.Health News/  Kalyn [id number] en la búsqueda para aprender más acerca de \"Semanas 14 a 18 de solano embarazo: Instrucciones de cuidado. \"  Revisado: 11 febrero, 2020               Versión del contenido: 12.6  © 0108-8949 Healthwise, Incorporated. Las instrucciones de cuidado fueron adaptadas bajo licencia por [employer]ctions (which disclaims liability or warranty for this information). Si usted tiene [place] afección médica o sobre estas instrucciones, siempre pregunte a solano profesional de armaan. Healthwise, Incorporated niega toda garantía o responsabilidad por solano uso de esta información.

## 2021-03-19 ENCOUNTER — OFFICE VISIT (OUTPATIENT)
Dept: FAMILY MEDICINE CLINIC | Age: 31
End: 2021-03-19

## 2021-03-19 ENCOUNTER — TELEPHONE (OUTPATIENT)
Dept: FAMILY MEDICINE CLINIC | Age: 31
End: 2021-03-19

## 2021-03-19 DIAGNOSIS — Z71.89 COUNSELING AND COORDINATION OF CARE: Primary | ICD-10-CM

## 2021-03-19 PROBLEM — D56.3 ALPHA THALASSEMIA SILENT CARRIER: Status: ACTIVE | Noted: 2021-03-19

## 2021-03-19 PROCEDURE — 99080 SPECIAL REPORTS OR FORMS: CPT | Performed by: PHYSICIAN ASSISTANT

## 2021-03-19 NOTE — TELEPHONE ENCOUNTER
Federico massey #[id number]  Called patient to discuss horizon screening coming back positive for silent carrier of alpha thal. Discussed next step of having FOB tested. Unfortunately FOB is not in this country. Per patient, he may be coming back to the [address], but she is not sure when. Patient is aware to discuss test results with FOB and need for testing. FOB will attempt to get testing in his country if he is able or if not will try to have it done when he comes back to the [address]. Patient understands and agrees to plan.     Jana Blackmon, [facility] Resident

## 2021-03-19 NOTE — PROGRESS NOTES
BHAVYA Pisano Go patient and assisted with pending bills. Patient does not know the process. Patient said she mailed her FA application about 2 weeks ago. OW explained what patient needs to do and told patient to call in about a month to find out status of her application. OW provided phone number where patient needs to call, Medical group and [facility]. Patient verbalized understanding.

## 2021-03-24 NOTE — PROGRESS NOTES
Estimated Date of Delivery: 21    Cervical length screen for history of 34 week PPROM  Normal cervical length  Desires theo  Likely subchorionic hemorrhage seen  F/u for anatomy in 4 weeks

## 2021-03-30 ENCOUNTER — HOSPITAL ENCOUNTER (OUTPATIENT)
Dept: PERINATAL CARE | Age: 31
Discharge: HOME | End: 2021-03-30
Attending: OBSTETRICS & GYNECOLOGY

## 2021-03-30 PROCEDURE — 76805 OB US >/= 14 WKS SNGL FETUS: CPT | Performed by: OBSTETRICS & GYNECOLOGY

## 2021-04-05 ENCOUNTER — ROUTINE PRENATAL (OUTPATIENT)
Dept: FAMILY MEDICINE CLINIC | Age: 31
End: 2021-04-05

## 2021-04-05 DIAGNOSIS — D56.3 ALPHA THALASSEMIA SILENT CARRIER: ICD-10-CM

## 2021-04-05 DIAGNOSIS — Z3A.20 20 WEEKS GESTATION OF PREGNANCY: ICD-10-CM

## 2021-04-05 DIAGNOSIS — Z3A.13 13 WEEKS GESTATION OF PREGNANCY: Primary | ICD-10-CM

## 2021-04-05 DIAGNOSIS — Z87.59 HISTORY OF PRETERM PREMATURE RUPTURE OF MEMBRANES (PPROM): ICD-10-CM

## 2021-04-05 DIAGNOSIS — O20.0 THREATENED ABORTION: ICD-10-CM

## 2021-04-05 DIAGNOSIS — O99.012 ANEMIA DURING PREGNANCY IN SECOND TRIMESTER: ICD-10-CM

## 2021-04-05 DIAGNOSIS — O41.8X11 SUBCHORIONIC HEMATOMA IN FIRST TRIMESTER, FETUS 1 OF MULTIPLE GESTATION: ICD-10-CM

## 2021-04-05 DIAGNOSIS — O46.8X1 SUBCHORIONIC HEMATOMA IN FIRST TRIMESTER, FETUS 1 OF MULTIPLE GESTATION: ICD-10-CM

## 2021-04-05 PROCEDURE — 0502F SUBSEQUENT PRENATAL CARE: CPT | Performed by: STUDENT IN AN ORGANIZED HEALTH CARE EDUCATION/TRAINING PROGRAM

## 2021-04-05 RX ORDER — ERGOCALCIFEROL 1.25 MG/1
50000 CAPSULE ORAL
Qty: 12 CAP | Refills: 0 | Status: SHIPPED | OUTPATIENT
Start: 2021-04-05

## 2021-04-05 RX ORDER — LANOLIN ALCOHOL/MO/W.PET/CERES
CREAM (GRAM) TOPICAL
COMMUNITY
End: 2021-04-05 | Stop reason: SDUPTHER

## 2021-04-05 RX ORDER — SWAB
1 SWAB, NON-MEDICATED MISCELLANEOUS DAILY
Qty: 90 TAB | Refills: 5 | Status: SHIPPED | OUTPATIENT
Start: 2021-04-05 | End: 2021-05-26 | Stop reason: SDUPTHER

## 2021-04-05 RX ORDER — LANOLIN ALCOHOL/MO/W.PET/CERES
325 CREAM (GRAM) TOPICAL
Qty: 90 TAB | Refills: 5 | Status: SHIPPED | OUTPATIENT
Start: 2021-04-05

## 2021-04-05 NOTE — PROGRESS NOTES
Ely Quezada  26 y.o. female  1990  [address]  [id number]    [phone] (home)      [address]:    [facility] Telephone Encounter  Barbara Macdonald MD       Encounter Date: 2021 at 9:20 AM    Consent:  She and/or the health care decision maker is aware that this encounter will be billed as a routine OB appointment and that she may receive a bill for this telephone service, depending on her insurance coverage, and has provided verbal consent to proceed: Yes    Follow-up Prenatal     History of Present Illness   Contractions: no  LOF: no  Vaginal bleeding: no  Fetal movement: yes, moving frequently    She states she has not had contractions but has felt significant pain in her back and low in her abdomen especially with walking    Vitals/Objective:   General: Patient speaking in complete sentences without effort. Normal speech and cooperative. Due to this being a Virtual Check-in/Telephone evaluation, many elements of the physical examination are unable to be assessed. Assessment and Plan:   Ely Quezada is a 26 y.o.  @ 20w6d evaluated by telephone. SIUP @ 20wk6d:  ? PNL: B+, Ab screen neg, HIV NR, Hep B neg, CBC w/Hb 9.9, VZV immune, RPR NR, Rubella  reactive, Hbg fract nml, urine cx no growth, GC/CT neg, pap NILM, HPV+ (HR neg). Per ASSCP guidelines, will need repeat cotesting in 1 year   ? Genetic Screening: low risk NIPT, male fetus  ? S/p Flu vaccine 2021  ? Anatomy scan with MFM: 3/30, anatomy normal, placenta anterior, baby cephanlic and CL 54ZZ, reassuring for GA     Other  · Hx of PPROM: Declined theo. 16wk and 20wk anatomy scan with adequate CL. Followup as CI. Discussed s/sx of PTL and to call immediately and/or present to emergent medical care should these s/sx present  · Alpha Thalessemia Carrier: no carrier screening. Can discuss benefit for future pregnancies.   · Dysuria: () Patient with dysuria and UA 1+ LE, neg nitrites. UCx no growth, treated with Keflex x7 days  · Threatened : at 12w2d 2/2 small subchorionic hemorrhage seen on US. Last Hg 9.9 (). Needs 2nd trimester HgB  · Anemia of Preg: Likely 2/2 subchorionic hem (see above). Continue iron TID     -Patient informed of her next appointment: 5/3/2021 @ 9:15am in clinic   -Advised to come in sooner for any concerns  -Pt informed that after 28 wk she will be asked to do weekly home BP monitoring and it was recommended she buy or borrow a cuff ahead of time. Alternative is going to a grocery store/pharmacy to check. One BP should be checked weekly and written in a log >28 weeks.  -Patient educated on kick counts (after 28 weeks): baby should move 10 times in 2 hours (can be a kick, roll, flutter, swish). -Patient was reminded about social distancing and to avoid going into public when possible. Patient understands that this encounter was a temporary measure, and the importance of further follow up and examination was emphasized. Patient verbalized understanding. I affirm this is a Patient Initiated Episode with an Established Patient who has not had a related appointment within my department in the past 7 days or scheduled within the next 24 hours. Note: not billable if this call serves to triage the patient into an appointment for the relevant concern      Electronically Signed: Ladonna Haddad MD  Providers location when delivering service: home    ICD-10-CM ICD-9-CM    1. 13 weeks gestation of pregnancy  Z3A.13 V22.2 prenatal vit-iron fumarate-fa (PRENATAL PLUS with IRON) 28 mg iron- 800 mcg tab      ferrous sulfate (Iron) 325 mg (65 mg iron) tablet      ergocalciferol (ERGOCALCIFEROL) 1,250 mcg (50,000 unit) capsule   2. Threatened   O20.0 640.00    3. 20 weeks gestation of pregnancy  Z3A.20 V22.2    4. Subchorionic hematoma in first trimester, fetus 1 of multiple gestation  O41.8X11 656.83     O46.8X1     5. Anemia during pregnancy in second trimester  O99.012 648.23    6. History of  premature rupture of membranes (PPROM)  Z87.59 V13.29    7. Alpha thalassemia silent carrier  D56.3 282.46           Pursuant to the emergency declaration under the [facility] [place] [address] waiver authority and the [employer] Act, this Virtual  Visit was conducted, with patient's consent, to reduce the patient's risk of exposure to COVID-19 and provide continuity of care for an established patient. History   Patients past medical, surgical and family histories were personally reviewed and updated. yes    Patient Active Problem List   Diagnosis Code    Threatened  O20.0    Pregnancy Z34.90    Subchorionic hematoma in first trimester O41.8X10, O46.8X1    Anemia during pregnancy in second trimester O99.012    History of  premature rupture of membranes (PPROM) Z87.59    Alpha thalassemia silent carrier D56.3              Current Medications/Allergies   Medications and Allergies reviewed:    Current Outpatient Medications   Medication Sig Dispense Refill    prenatal vit-iron fumarate-fa (PRENATAL PLUS with IRON) 28 mg iron- 800 mcg tab Take 1 Tab by mouth daily. Indications: pregnancy 90 Tab 5    ferrous sulfate (Iron) 325 mg (65 mg iron) tablet Take 1 Tab by mouth three (3) times daily (with meals). 90 Tab 5    ergocalciferol (ERGOCALCIFEROL) 1,250 mcg (50,000 unit) capsule Take 1 Cap by mouth every seven (7) days. 12 Cap 0    docusate sodium (Colace) 100 mg capsule Take 1 Cap by mouth two (2) times a day for 90 days.  60 Cap 2     No Known Allergies

## 2021-04-06 PROBLEM — Z34.90 PREGNANCY: Status: ACTIVE | Noted: 2021-02-09

## 2021-04-08 ENCOUNTER — TELEPHONE (OUTPATIENT)
Dept: FAMILY MEDICINE CLINIC | Age: 31
End: 2021-04-08

## 2021-04-08 NOTE — TELEPHONE ENCOUNTER
----- Message from [employer] sent at 4/8/2021  2:34 PM EDT -----  Regarding: Dr. Ian Vazquez  General Message/Vendor Calls    Caller's first and last name: Julita/[facility]      Reason for call: Callback request      Callback required yes/no and why: yes/caller request      Best contact number(s): [phone] ext 1996      Details to clarify the request: [facility] office needs to schedule shipment for \"theo\" injection      [employer] [facility]

## 2021-04-13 ENCOUNTER — TELEPHONE (OUTPATIENT)
Dept: FAMILY MEDICINE CLINIC | Age: 31
End: 2021-04-13

## 2021-04-13 NOTE — TELEPHONE ENCOUNTER
----- Message from Vaishali Riley sent at 4/12/2021  3:00 PM EDT -----  Regarding: /Telephone  General Message/Vendor Calls    Caller's first and last name:Julita Shaffer      Reason for call:Julita advised needing to know if [employer] Injections need to be resent. Callback required yes/no and why:yes, to clarify       Best contact number(s):[phone] ext 1996      Details to clarify the request:Julita advised pt's shipment was refused.       [employer] Rose[employer]

## 2021-04-15 NOTE — PROGRESS NOTES
Estimated Date of Delivery: 21    Declines theo for hx of PPROM at 34 weeks  Normal cervical length   Normal anatomy   F/u as CI

## 2021-04-15 NOTE — TELEPHONE ENCOUNTER
/Telephone---4/14  Received: Melissa Camarillo  Message Contents   [facility], [address]  Message/Vendor Calls     Caller's first and last name: [facility]       Reason for call:Julita advised it is regarding [place] injections.        Callback required yes/no and why:yes, to clarify       Best contact number(s):[phone] ext 1996       Details to clarify the request:N/A       Flaca Rosas

## 2021-04-16 ENCOUNTER — TELEPHONE (OUTPATIENT)
Dept: FAMILY MEDICINE CLINIC | Age: 31
End: 2021-04-16

## 2021-04-16 NOTE — TELEPHONE ENCOUNTER
Demetra with [facility] calling, wants to inform nurse Alex Lockhart) that patient is not answering there calls.     Ph [address]

## 2021-04-22 NOTE — TELEPHONE ENCOUNTER
Rakel Pizano, DO/telephone  Received: Marcia David  Message Contents   Russelllucio Oliva, April M  P [facility] Front Office             General Message/Vendor Calls     Caller's first and last name: Erasmo Pepe from [facility]         Reason for call: Requested a call back       Callback required yes/no and why: Yes       Best contact number(s): [phone] ext 1996       Details to clarify the request: Erasmo Pepe would like for Mandy Chacon to verify if the patient is still on therapy with [place]. The patient stated the doctor advised her that she no longer needed therapy.        April [address]

## 2021-04-28 ENCOUNTER — TELEPHONE (OUTPATIENT)
Dept: FAMILY MEDICINE CLINIC | Age: 31
End: 2021-04-28

## 2021-04-28 NOTE — TELEPHONE ENCOUNTER
----- Message from [email or web address] sent at 4/23/2021  2:43 PM EDT -----  Regarding: Dr. Ian Vazquez  General Message/Vendor Calls    Caller's first and last name: Chloe Phillips from [facility]      Reason for call: callback request      Callback required yes/no and why: yes/caller request      Best contact number(s): [phone] ext.  1996      Details to clarify the request:requesting a callback pertaining to \"mutual pt\"/refused to provide any further information than that      [employer]

## 2021-04-29 ENCOUNTER — TELEPHONE (OUTPATIENT)
Dept: FAMILY MEDICINE CLINIC | Age: 31
End: 2021-04-29

## 2021-04-29 NOTE — TELEPHONE ENCOUNTER
----- Message from Karishma Harp sent at 4/27/2021  2:26 PM EDT -----  Regarding: Dr. Ramone Sheridan first and last name: Juju Marquez calling from [facility] connection      Reason for call: needs to speak to [facility] required yes/no and why: yes      Best contact number(s): [phone] ext 1996      Details to clarify the request:      Karishma Harp

## 2021-05-02 NOTE — TELEPHONE ENCOUNTER
Tried calling kimberly back. Unable to leave message. If she calls back please inform her patient is NOT on theo injections.

## 2021-05-05 ENCOUNTER — TELEPHONE (OUTPATIENT)
Dept: FAMILY MEDICINE CLINIC | Age: 31
End: 2021-05-05

## 2021-05-05 NOTE — TELEPHONE ENCOUNTER
----- Message from [facility] sent at 5/3/2021 10:48 AM EDT -----  Regarding: Dr. Barbara Chaudhary  Appointment not available    Caller's first and last name and relationship to patient (if not the patient):  N/A      Best contact number:  [phone]      Preferred date and time:  Next available      Scheduled appointment date and time:    N/A      Reason for appointment:  Ob/pre-      Details to clarify the request:   Patient is requesting a call back to reschedule her appointment that was today, 5/3, at 10:45a.m. She could not make this appointment. She will also require a  during the return phone call and at the appointment.       [facility]

## 2021-05-10 ENCOUNTER — ROUTINE PRENATAL (OUTPATIENT)
Dept: FAMILY MEDICINE CLINIC | Age: 31
End: 2021-05-10

## 2021-05-10 VITALS
BODY MASS INDEX: 32.75 KG/M2 | WEIGHT: 166.8 LBS | RESPIRATION RATE: 16 BRPM | SYSTOLIC BLOOD PRESSURE: 108 MMHG | HEIGHT: 60 IN | TEMPERATURE: 97.8 F | HEART RATE: 92 BPM | DIASTOLIC BLOOD PRESSURE: 69 MMHG | OXYGEN SATURATION: 99 %

## 2021-05-10 DIAGNOSIS — O26.812 PREGNANCY RELATED FATIGUE IN SECOND TRIMESTER: ICD-10-CM

## 2021-05-10 DIAGNOSIS — O20.0 THREATENED ABORTION: ICD-10-CM

## 2021-05-10 DIAGNOSIS — D56.3 ALPHA THALASSEMIA SILENT CARRIER: ICD-10-CM

## 2021-05-10 DIAGNOSIS — O41.8X11 SUBCHORIONIC HEMATOMA IN FIRST TRIMESTER, FETUS 1 OF MULTIPLE GESTATION: ICD-10-CM

## 2021-05-10 DIAGNOSIS — O46.8X1 SUBCHORIONIC HEMATOMA IN FIRST TRIMESTER, FETUS 1 OF MULTIPLE GESTATION: ICD-10-CM

## 2021-05-10 DIAGNOSIS — O99.012 ANEMIA DURING PREGNANCY IN SECOND TRIMESTER: ICD-10-CM

## 2021-05-10 DIAGNOSIS — Z3A.25 25 WEEKS GESTATION OF PREGNANCY: Primary | ICD-10-CM

## 2021-05-10 DIAGNOSIS — Z87.59 HISTORY OF PRETERM PREMATURE RUPTURE OF MEMBRANES (PPROM): ICD-10-CM

## 2021-05-10 PROCEDURE — 0502F SUBSEQUENT PRENATAL CARE: CPT | Performed by: STUDENT IN AN ORGANIZED HEALTH CARE EDUCATION/TRAINING PROGRAM

## 2021-05-10 NOTE — PROGRESS NOTES
I reviewed with the resident the medical history and the resident's findings on the physical examination. I discussed with the resident the patient's diagnosis and concur with the plan. 31yo  @ 25w6d by LMP c/w 12wk sono   1. IUP: RH pos, CFFDNA wnl, GTT+CBC  2. Hx PTD: after PPROM in [facility] at [address], records unavailable, declined [place] after further discussion, normal CLS  3.  HPV pos pap: repeat pap in a year   4. Alpha thal silent carrier: on [facility], offered testing for FOB but he does not live in this country and will try to get tested where he is.

## 2021-05-10 NOTE — PROGRESS NOTES
Chief Complaint   Patient presents with    Routine Prenatal Visit     she is 25w 6d today. she denies bleeding, LOF, and contractions. she has some yellow, watery discharge x 5 days. denies itchiness and foul smell. she has some abdominal and back pain. she can feel the baby moving     1. Have you been to the ER, urgent care clinic since your last visit? Hospitalized since your last visit? No    2. Have you seen or consulted any other health care providers outside of the [address] [place] [address] since your last visit? Include any pap smears or colon screening.  No

## 2021-05-10 NOTE — PATIENT INSTRUCTIONS
Semanas 22 a 26 de solano embarazo: Instrucciones de cuidado Weeks 22 to 26 of Your Pregnancy: Care Instructions Instrucciones de cuidado Al comenzar el 7.º mes de solano embarazo en la semana 32, los pulmones de solano bebé se están volviendo más randi y se están preparando para respirar. Podría notar que solano bebé responde al jian de solano voz o la de solano vickey. También podría notar que solano bebé se voltea y [place] menos de posición, y se retuerce o sacude más. Por lo general, las sacudidas indican que solano bebé tiene hipo. Tener hipo es totalmente normal y dura poco tiempo. Erik vez sea buena idea pensar en asistir a raymond clase de preparación para el parto. [place] es también un buen momento para comenzar a pensar si desea que josee el parto le administren un analgésico (medicamento para el dolor). A la mayoría de las mujeres embarazadas les hacen pruebas para la diabetes gestacional entre las semanas 24 y 29. La diabetes gestacional ocurre cuando el nivel de azúcar en la vicky es muy alto josee el [facility]. La prueba es importante, porque usted puede tener diabetes gestacional y no saberlo. Farhan esta enfermedad puede causarle problemas a solano bebé. La atención de seguimiento es raymond parte clave de solano tratamiento y seguridad. Asegúrese de hacer y acudir a todas las citas, y llame a solano médico si está teniendo problemas. También es raymond buena idea saber los resultados de edyta exámenes y mantener raymond lista de los medicamentos que arnulfo. Cómo puede cuidarse en el hogar? Alivie las molestias de las patadas de solano bebé · Cambie de posición. A veces, haider cambio hace que solano bebé también cambie de posición. · Respire hondo al mismo tiempo que levanta los brazos sobre solano [place]. Después exhale a medida que baja los brazos. German los ejercicios de Kegel para evitar pérdidas de [facility] · Puede hacer los ejercicios de Kegel estando verdugo o sentada. ? Apriete los mismos músculos que usaría para detener el chorro de [place].  El abdomen y los muslos no deben moverse. ? Manténgalos apretados josee 3 segundos y, luego, relájelos otros 3 segundos. ? Empiece con 3 segundos. Kaity Madison, añada 1 cassie cada semana hasta que sea capaz de apretar josee 10 segundos. ? Repita el ejercicio entre 10 y 13 veces Owen9 Yamini White German sherwin o más sesiones cada día. Alivie o reduzca la hinchazón de los pies, los tobillos, las rian y los dedos · Si tiene los dedos hinchados, quítese los [place]. · No coma alimentos con mucha sal, omer lizette fritas. · Coloque edyta pies sobre un banco o un sofá todo el tiempo que le sea posible. Duerma con almohadas debajo de los pies. · No se quede de pie josee mucho tiempo ni use calzado ajustado. · Use medias elásticas de soporte. Dónde puede encontrar más información en inglés? Chito Marsha a http://www.gray.com/ Escriba G264 en la búsqueda para aprender más acerca de \"Semanas 22 a 26 de solano embarazo: Instrucciones de cuidado. \" Revisado: 8 octubre, 2020               Versión del contenido: 12.8 © 2006-2021 Healthwise, Incorporated. Las instrucciones de cuidado fueron adaptadas bajo licencia por [employer] (which disclaims liability or warranty for this information). Si usted tiene [place] afección médica o sobre estas instrucciones, siempre pregunte a solano profesional de armaan. Healthwise, Incorporated niega toda garantía o responsabilidad por solano uso de esta información.

## 2021-05-10 NOTE — PROGRESS NOTES
Return OB Visit       Subjective:   David Cortez 26 y.o.   MARY: 2021, by Last Menstrual Period  GA:  25w6d. LOF: none  Vaginal bleeding: none  Fetal movement (after 20 weeks): yes  Contractions: none     Nose bleeding about 3 times per week for the past few weeks. Sometimes nose bleeds just are small amount during a sneeze, other times nose bleeds last for <5 minutes and she applies pressure to her nose and then they stop. She is compliant with iron TID. Low back pain: midline, non radiating, intermittent (worse with movement). Has not taken anything orally for the pain, has tried a cream on her back that is a Hebrew brand but she is unsure the name. Allergies- reviewed:   No Known Allergies  Medications- reviewed:   Current Outpatient Medications   Medication Sig    prenatal vit-iron fumarate-fa (PRENATAL PLUS with IRON) 28 mg iron- 800 mcg tab Take 1 Tab by mouth daily. Indications: pregnancy    ferrous sulfate (Iron) 325 mg (65 mg iron) tablet Take 1 Tab by mouth three (3) times daily (with meals).  ergocalciferol (ERGOCALCIFEROL) 1,250 mcg (50,000 unit) capsule Take 1 Cap by mouth every seven (7) days.  docusate sodium (Colace) 100 mg capsule Take 1 Cap by mouth two (2) times a day for 90 days. No current facility-administered medications for this visit. Past Medical History- reviewed:  Past Medical History:   Diagnosis Date    Anemia      delivery      Past Surgical History- reviewed:   No past surgical history on file.   Social History- reviewed:  Social History     Socioeconomic History    Marital status: SINGLE     Spouse name: Not on file    Number of children: Not on file    Years of education: Not on file    Highest education level: Not on file   Occupational History    Not on file   Social Needs    Financial resource strain: Not on file    Food insecurity     Worry: Not on file     Inability: Not on file   [place] needs Medical: Not on file     Non-medical: Not on file   Tobacco Use    Smoking status: Never Smoker    Smokeless tobacco: Never Used   Substance and Sexual Activity    Alcohol use: Not Currently    Drug use: Never    Sexual activity: Yes     Partners: Male   Lifestyle    Physical activity     Days per week: Not on file     Minutes per session: Not on file    Stress: Not on file   Relationships    Social connections     Talks on phone: Not on file     Gets together: Not on file     Attends Congregation service: Not on file     Active member of club or organization: Not on file     Attends meetings of clubs or organizations: Not on file     Relationship status: Not on file    Intimate partner violence     Fear of current or ex partner: Not on file     Emotionally abused: Not on file     Physically abused: Not on file     Forced sexual activity: Not on file   Other Topics Concern    Not on file   Social History Narrative    Not on file     Immunizations- reviewed:   Immunization History   Administered Date(s) Administered    Influenza Vaccine HireHive) PF (>6 Mo Flulaval, Fluarix, and >3 Yrs Afluria, Fluzone 72736) 2021       Objective:     Visit Vitals  /69   Pulse 92   Temp 97.8 °F (36.6 °C) (Temporal)   Resp 16   Ht 5' (1.524 m)   Wt 166 lb 12.8 oz (75.7 kg)   LMP 11/10/2020 (Exact Date) Comment: pt thought she misscarried in January   SpO2 99%   BMI 32.58 kg/m²       Physical Exam:  GENERAL APPEARANCE: alert, well appearing, in no apparent distress  ABDOMEN: gravid, fundal height 27 cm, FHT present at 145 bpm  PSYCH: normal mood and affect    Labs  No results found for this or any previous visit (from the past 12 hour(s)). Assessment         ICD-10-CM ICD-9-CM    1. 25 weeks gestation of pregnancy  Z3A.25 V22.2 GLUCOSE, GESTATIONAL 1 HR TOLERANCE      CBC W/O DIFF   2. Subchorionic hematoma in first trimester, fetus 1 of multiple gestation  O41.8X11 656.83     O46.8X1     3.  Threatened  O20.0 640.00    4. History of  premature rupture of membranes (PPROM)  Z87.59 V13.29    5. Anemia during pregnancy in second trimester  O99.012 648.23    6. Alpha thalassemia silent carrier  D56.3 282.46    7. Pregnancy related fatigue in second trimester  O26.812 646.83 TSH 3RD GENERATION     780.79          Plan   26 y.o.  25w6d MARY 2021, by Last Menstrual Period here for return OB visit     1. IUP: PNL: B+, Ab screen neg, HIV NR, Hep B neg, CBC w/Hb 9.9, VZV immune, RPR NR, Rubella  reactive, Hbg fract nml, urine cx no growth, GC/CT neg, pap NILM, HPV+ (HR neg). Per ASSCP guidelines, will need repeat cotesting in 1 year. Genetic Screening: low risk NIPT, male fetus. Anatomy scan 3/30- normal  2. Hx of PPROM: Declined theo. 16wk and 20wk anatomy scan with adequate CL. Followup as CI. Discussed s/sx of PTL and to call immediately and/or present to emergent medical care should these s/sx present  3. Alpha Thalessemia Carrier: Recommended testing for FOB, which patient reports he has declined because he has betsy everything will be ok with the baby. He is also currently in [facility]   4. Threatened : at 12w2d 2/2 small subchorionic hemorrhage seen on US. Last Hg 9.9 (). Needs 2nd trimester HgB  5. Anemia of Preg: Likely 2/2 hx of subchorionic hem and recent nose bleeds. Continue iron TID. Repeat CBC today. For nose bleeds- recommended humidifier and continuing pressure application when they occur  6. Low back pain: Encouraged tylenol and heating pad prn.  Advised against Senegalese cream as cannot ensure if it is safe in pregnancy or not without knowing what it is    Orders Placed This Encounter    GLUCOSE, GESTATIONAL 1 HR TOLERANCE     Standing Status:   Future     Standing Expiration Date:   2022    CBC W/O DIFF     Standing Status:   Future     Standing Expiration Date:   5/10/2022    TSH 3RD GENERATION     Standing Status:   Future     Standing Expiration Date:   5/10/2022 Labor precautions discussed, including: Regular painful contractions, lasting for greater than one hour, taking your breath away; any vaginal bleeding; any leakage of fluid; or absent or decreased fetal movement. Call M.D. on call if any of these symptoms or signs occur. I have discussed the diagnosis with the patient and the intended plan as seen in the above orders. The patient has received an after-visit summary and questions were answered concerning future plans. I have discussed medication side effects and warnings with the patient as well. Informed pt to return to the office or go to the ER if she experiences vaginal bleeding, vaginal discharge, leaking of fluid, pelvic cramping.     Patient discussed with Dr. Kristi Reynaga (attending physician)    Josemanuel Csatillo DO  Family Medicine Resident

## 2021-05-11 LAB
ERYTHROCYTE [DISTWIDTH] IN BLOOD BY AUTOMATED COUNT: 16.1 % (ref 11.5–14.5)
GLUCOSE 1H P 100 G GLC PO SERPL-MCNC: 152 MG/DL (ref 65–140)
HCT VFR BLD AUTO: 38.2 % (ref 35–47)
HGB BLD-MCNC: 11.5 G/DL (ref 11.5–16)
MCH RBC QN AUTO: 25.7 PG (ref 26–34)
MCHC RBC AUTO-ENTMCNC: 30.1 G/DL (ref 30–36.5)
MCV RBC AUTO: 85.5 FL (ref 80–99)
NRBC # BLD: 0 K/UL (ref 0–0.01)
NRBC BLD-RTO: 0 PER 100 WBC
PLATELET # BLD AUTO: 217 K/UL (ref 150–400)
PMV BLD AUTO: 12.5 FL (ref 8.9–12.9)
RBC # BLD AUTO: 4.47 M/UL (ref 3.8–5.2)
TSH SERPL DL<=0.05 MIU/L-ACNC: 1.39 UIU/ML (ref 0.36–3.74)
WBC # BLD AUTO: 8.5 K/UL (ref 3.6–11)

## 2021-05-12 DIAGNOSIS — O99.810 ABNORMAL GLUCOSE TOLERANCE TEST (GTT) DURING PREGNANCY, ANTEPARTUM: Primary | ICD-10-CM

## 2021-05-12 NOTE — PROGRESS NOTES
TSH wnl  CBC ok, Hg improved to 11.5 from 9.9. Continue iron TID  1hr , patient will need 3hr GTT scheduled. Called patient to make her aware of results. [facility]

## 2021-05-14 ENCOUNTER — LAB ONLY (OUTPATIENT)
Dept: FAMILY MEDICINE CLINIC | Age: 31
End: 2021-05-14

## 2021-05-14 DIAGNOSIS — O99.810 ABNORMAL GLUCOSE TOLERANCE TEST (GTT) DURING PREGNANCY, ANTEPARTUM: ICD-10-CM

## 2021-05-14 LAB
GESTATIONAL 3HR GTT,GESTA: NORMAL
GLUCOSE 1H P 100 G GLC PO SERPL-MCNC: 176 MG/DL (ref 65–180)
GLUCOSE P FAST SERPL-MCNC: 86 MG/DL (ref 65–95)
GLUCOSE, 2 HR,GSTT2: 126 MG/DL (ref 65–155)
GLUCOSE, 3 HR,GSTT3: 101 MG/DL (ref 65–140)

## 2021-05-26 ENCOUNTER — ROUTINE PRENATAL (OUTPATIENT)
Dept: FAMILY MEDICINE CLINIC | Age: 31
End: 2021-05-26

## 2021-05-26 VITALS — BODY MASS INDEX: 33.01 KG/M2 | DIASTOLIC BLOOD PRESSURE: 69 MMHG | SYSTOLIC BLOOD PRESSURE: 109 MMHG | WEIGHT: 169 LBS

## 2021-05-26 DIAGNOSIS — O09.90 SUPERVISION OF HIGH RISK PREGNANCY, ANTEPARTUM: Primary | ICD-10-CM

## 2021-05-26 DIAGNOSIS — Z87.59 HISTORY OF PRETERM PREMATURE RUPTURE OF MEMBRANES (PPROM): ICD-10-CM

## 2021-05-26 DIAGNOSIS — Z3A.13 13 WEEKS GESTATION OF PREGNANCY: ICD-10-CM

## 2021-05-26 DIAGNOSIS — D56.3 ALPHA THALASSEMIA SILENT CARRIER: ICD-10-CM

## 2021-05-26 PROCEDURE — 90715 TDAP VACCINE 7 YRS/> IM: CPT | Performed by: FAMILY MEDICINE

## 2021-05-26 PROCEDURE — 0502F SUBSEQUENT PRENATAL CARE: CPT | Performed by: FAMILY MEDICINE

## 2021-05-26 RX ORDER — SWAB
1 SWAB, NON-MEDICATED MISCELLANEOUS DAILY
Qty: 90 TABLET | Refills: 5 | Status: SHIPPED | OUTPATIENT
Start: 2021-05-26

## 2021-05-26 NOTE — PROGRESS NOTES
I reviewed with the resident the medical history and the resident's findings on the physical examination. I discussed with the resident the patient's diagnosis and concur with the plan. 29yo  @ 28w1d by LMP c/w 12wk sono   1. IUP: RH pos, CFFDNA wnl, GTT+CBC ok, s/p tdap  2. Hx PTD: after PPROM in [facility] [address] [facility], records unavailable, declined Rachelle after further discussion, normal CLS  3.  HPV pos pap: repeat pap in a year   4. Alpha thal silent carrier: on [place], offered testing for FOB but he does not live in this country and will try to get tested where he is.

## 2021-06-07 ENCOUNTER — ROUTINE PRENATAL (OUTPATIENT)
Dept: FAMILY MEDICINE CLINIC | Age: 31
End: 2021-06-07

## 2021-06-07 DIAGNOSIS — N30.90 CYSTITIS: ICD-10-CM

## 2021-06-07 DIAGNOSIS — O99.013 ANEMIA DURING PREGNANCY IN THIRD TRIMESTER: ICD-10-CM

## 2021-06-07 DIAGNOSIS — Z3A.29 29 WEEKS GESTATION OF PREGNANCY: Primary | ICD-10-CM

## 2021-06-07 DIAGNOSIS — D56.3 ALPHA THALASSEMIA SILENT CARRIER: ICD-10-CM

## 2021-06-07 DIAGNOSIS — Z87.59 HISTORY OF PRETERM PREMATURE RUPTURE OF MEMBRANES (PPROM): ICD-10-CM

## 2021-06-07 PROCEDURE — 0502F SUBSEQUENT PRENATAL CARE: CPT | Performed by: STUDENT IN AN ORGANIZED HEALTH CARE EDUCATION/TRAINING PROGRAM

## 2021-06-07 RX ORDER — CEPHALEXIN 250 MG/1
250 CAPSULE ORAL 4 TIMES DAILY
Qty: 28 CAPSULE | Refills: 0 | Status: SHIPPED | OUTPATIENT
Start: 2021-06-07 | End: 2021-06-14

## 2021-06-07 NOTE — PROGRESS NOTES
I reviewed with the resident the medical history and the resident's findings on the physical examination. I discussed with the resident the patient's diagnosis and concur with the plan. 29yo  @ 29w6d by LMP c/w 12wk rafy   1. IUP: RH pos, CFFDNA wnl, GTT+CBC ok, s/p tdap  2. Hx PTD: after PPROM in [facility] [address] [facility], records unavailable, declined Rachelle after further discussion, normal CLS  3.  HPV pos pap: repeat pap in a year   4. Alpha thal silent carrier: on [place], offered testing for FOB but he does not live in this country and will try to get tested where he is.     5.  Empirically treating for UTI: can get urine cx next visit

## 2021-06-07 NOTE — PROGRESS NOTES
Manuelito Miranda  26 y.o. female  1990  [address]  [id number]    [phone] (home)      [address]:    [facility] Telephone Encounter  Americo Napier MD       Encounter Date: 2021 at 1:35 PM    Consent:  She and/or the health care decision maker is aware that this encounter will be billed as a routine OB appointment and that she may receive a bill for this telephone service, depending on her insurance coverage, and has provided verbal consent to proceed: Yes    Follow-up Prenatal     History of Present Illness   Used . Contractions: no  LOF: no  Vaginal bleeding: no  Fetal movement: yes    Reports intermittent lower abdominal and back pain that started yesterday. Only occurs with walking or when rising from sitting position. Also reports dysuria that started one week ago. Has some pink spotting with wiping. Has been drinking plenty of water with lemon. Denies flank pain or fevers but states she felt chilly. Vitals/Objective:   General: Patient speaking in complete sentences without effort. Normal speech and cooperative. Due to this being a Virtual Check-in/Telephone evaluation, many elements of the physical examination are unable to be assessed. Assessment and Plan:   Manuelito Miranda is a 26 y.o.  @ 29w6d evaluated by telephone. SIUP at 29w6d:  -PNL:B+, Ab screen neg, HIV, Hep B. GC/Chlamydia, RPR neg.  CBC w/Hb 9.9, Hgb fractionation nml. VZV, Rubella immune. Pap NILM, HPV pos (high risk neg). Urine cx no growth. -NIPT low risk  -Anatomy scan nml  -Passed 3hr GTT  -Flu and Tdap received    Hx of PPROM: Declined theo. 16wk and 20wk anatomy scan with adequate CL.  labor precautions discussed. Dysuria: Treated with Keflex for UTI in 2021; currently symptomatic. Does not have a ride to leave urine sample today; will treat empirically and follow up [place] urine cx next visit.   -Keflex QID x7 days    Alpha Thalessemia Carrier: Recommended testing for FOB, which patient reports he has declined because he has betsy everything will be ok with the baby. He is also currently in [facility]. Threatened : at 12w2d 2/2 small subchorionic hemorrhage seen on US. Last Hg 9.9 (). Anemia: Hgb 9.9->11.5. Likely 2/2 hx of subchorionic hemorrhage and recent nose bleeds.   -Continue iron TID  -Advised to apply pressure to nose and use humidifier to help with nose bleeds    NILM, HPV positive: High risk HPV neg.  -Repeat cotesting in 1 year per ASCCP guidelines    Low back pain:   -Encouraged tylenol and heating pad prn.          -Patient informed of her next appointment: 2021 (confirm if telephone or in office and inform patient)  -Advised to come in sooner for any concerns  -Pt informed that after 28 wk she will be asked to do weekly home BP monitoring and it was recommended she buy or borrow a cuff ahead of time. Alternative is going to a grocery store/pharmacy to check. One BP should be checked weekly and written in a log >28 weeks.  -Patient educated on kick counts (after 28 weeks): baby should move 10 times in 2 hours (can be a kick, roll, flutter, swish). -Patient was reminded about social distancing and to avoid going into public when possible. Patient understands that this encounter was a temporary measure, and the importance of further follow up and examination was emphasized. Patient verbalized understanding. I affirm this is a Patient Initiated Episode with an Established Patient who has not had a related appointment within my department in the past 7 days or scheduled within the next 24 hours.   Note: not billable if this call serves to triage the patient into an appointment for the relevant concern      Electronically Signed: Marco Antonio Cheung MD, FM resident  Providers location when delivering service: home      ICD-10-CM ICD-9-CM    1. 29 weeks gestation of pregnancy Z3A.29 V22.2    2. History of  premature rupture of membranes (PPROM)  Z87.59 V13.29    3. Alpha thalassemia silent carrier  D56.3 282.46    4. Anemia during pregnancy in third trimester  O99.013 648.23    5. Cystitis  N30.90 595.9        Pursuant to the emergency declaration under the [facility] [place] [address] [facility] authority and the [employer] Act, this Virtual  Visit was conducted, with patient's consent, to reduce the patient's risk of exposure to COVID-19 and provide continuity of care for an established patient. History   Patients past medical, surgical and family histories were personally reviewed and updated. yes    Patient Active Problem List   Diagnosis Code    Threatened  O20.0    Pregnancy Z34.90    Subchorionic hematoma in first trimester O41.8X10, O46.8X1    Anemia during pregnancy in second trimester O99.012    History of  premature rupture of membranes (PPROM) Z87.59    Alpha thalassemia silent carrier D56.3              Current Medications/Allergies   Medications and Allergies reviewed:    Current Outpatient Medications   Medication Sig Dispense Refill    cephALEXin (KEFLEX) 250 mg capsule Take 1 Capsule by mouth four (4) times daily for 7 days. 28 Capsule 0    prenatal vit-iron fumarate-fa (PRENATAL PLUS with IRON) 28 mg iron- 800 mcg tab Take 1 Tablet by mouth daily. Indications: pregnancy 90 Tablet 5    ferrous sulfate (Iron) 325 mg (65 mg iron) tablet Take 1 Tab by mouth three (3) times daily (with meals). 90 Tab 5    ergocalciferol (ERGOCALCIFEROL) 1,250 mcg (50,000 unit) capsule Take 1 Cap by mouth every seven (7) days.  12 Cap 0     No Known Allergies

## 2021-06-25 ENCOUNTER — ROUTINE PRENATAL (OUTPATIENT)
Dept: FAMILY MEDICINE CLINIC | Age: 31
End: 2021-06-25

## 2021-06-25 VITALS
TEMPERATURE: 98.4 F | HEIGHT: 60 IN | BODY MASS INDEX: 33.81 KG/M2 | WEIGHT: 172.2 LBS | SYSTOLIC BLOOD PRESSURE: 96 MMHG | DIASTOLIC BLOOD PRESSURE: 64 MMHG | RESPIRATION RATE: 16 BRPM | HEART RATE: 92 BPM | OXYGEN SATURATION: 98 %

## 2021-06-25 DIAGNOSIS — O99.012 ANEMIA DURING PREGNANCY IN SECOND TRIMESTER: ICD-10-CM

## 2021-06-25 DIAGNOSIS — Z87.59 HISTORY OF PRETERM PREMATURE RUPTURE OF MEMBRANES (PPROM): ICD-10-CM

## 2021-06-25 DIAGNOSIS — O46.8X1 SUBCHORIONIC HEMATOMA IN FIRST TRIMESTER, FETUS 1 OF MULTIPLE GESTATION: ICD-10-CM

## 2021-06-25 DIAGNOSIS — O41.8X11 SUBCHORIONIC HEMATOMA IN FIRST TRIMESTER, FETUS 1 OF MULTIPLE GESTATION: ICD-10-CM

## 2021-06-25 DIAGNOSIS — Z34.90 ENCOUNTER FOR SUPERVISION OF NORMAL PREGNANCY, ANTEPARTUM, UNSPECIFIED GRAVIDITY: Primary | ICD-10-CM

## 2021-06-25 PROCEDURE — 0502F SUBSEQUENT PRENATAL CARE: CPT | Performed by: FAMILY MEDICINE

## 2021-06-25 NOTE — PROGRESS NOTES
Poppy Ac is a 33 y.o. female    Chief Complaint   Patient presents with    Routine Prenatal Visit     Patient is 32 weeks and 3 days. She is not having vaginal bleeding or discharge. She is taking prenatal vitamins. She is having fetal movement. No contractions. She is asking about COVID vaccine. No other cocnerns. 1. Have you been to the ER, urgent care clinic since your last visit? Hospitalized since your last visit? No    2. Have you seen or consulted any other health care providers outside of the [address] [place] [address] since your last visit? Include any pap smears or colon screening. No      Visit Vitals  BP 96/64 (BP 1 Location: Left upper arm, BP Patient Position: Sitting)   Pulse 92   Temp 98.4 °F (36.9 °C) (Oral)   Resp 16   Ht 5' (1.524 m)   Wt 172 lb 3.2 oz (78.1 kg)   SpO2 98%   BMI 33.63 kg/m²           Health Maintenance Due   Topic Date Due    Hepatitis C Screening  Never done    COVID-19 Vaccine (1) Never done         Medication Reconciliation completed, changes noted.   Please  Update medication list.    Vaginal Bleeding: No  Vaginal discharge: No  Fetal movement: Yes  Contractions: No   Prenatal Vitamins: yes

## 2021-06-25 NOTE — PROGRESS NOTES
29yo  @ 32w3d by LMP c/w 12wk sono   1. IUP: RH pos, CFFDNA wnl, GTT+CBC ok, s/p tdap  2. Hx PTD: after PPROM in [facility] [address] [facility], records unavailable, declined Rachelle after further discussion, normal CLS  3.  HPV pos pap: repeat pap in a year   4. Alpha thal silent carrier: on [place], offered testing for FOB but he does not live in this country and will try to get tested where he is.       Desires Covid vaccine - will have VDH schedule

## 2021-07-09 ENCOUNTER — ROUTINE PRENATAL (OUTPATIENT)
Dept: FAMILY MEDICINE CLINIC | Age: 31
End: 2021-07-09

## 2021-07-09 VITALS
SYSTOLIC BLOOD PRESSURE: 100 MMHG | OXYGEN SATURATION: 97 % | DIASTOLIC BLOOD PRESSURE: 65 MMHG | TEMPERATURE: 97.6 F | BODY MASS INDEX: 34.79 KG/M2 | WEIGHT: 177.2 LBS | HEART RATE: 98 BPM | RESPIRATION RATE: 18 BRPM | HEIGHT: 60 IN

## 2021-07-09 DIAGNOSIS — Z87.59 HISTORY OF PRETERM PREMATURE RUPTURE OF MEMBRANES (PPROM): ICD-10-CM

## 2021-07-09 DIAGNOSIS — R30.0 DYSURIA: ICD-10-CM

## 2021-07-09 DIAGNOSIS — D56.3 ALPHA THALASSEMIA SILENT CARRIER: ICD-10-CM

## 2021-07-09 DIAGNOSIS — O99.013 ANEMIA DURING PREGNANCY IN THIRD TRIMESTER: ICD-10-CM

## 2021-07-09 DIAGNOSIS — E55.9 VITAMIN D DEFICIENCY: ICD-10-CM

## 2021-07-09 DIAGNOSIS — R87.810 PAPANICOLAOU SMEAR OF CERVIX WITH POSITIVE HIGH RISK HUMAN PAPILLOMA VIRUS (HPV) TEST: ICD-10-CM

## 2021-07-09 DIAGNOSIS — Z3A.34 34 WEEKS GESTATION OF PREGNANCY: Primary | ICD-10-CM

## 2021-07-09 LAB — 25(OH)D3 SERPL-MCNC: 42.6 NG/ML (ref 30–100)

## 2021-07-09 PROCEDURE — 0502F SUBSEQUENT PRENATAL CARE: CPT | Performed by: STUDENT IN AN ORGANIZED HEALTH CARE EDUCATION/TRAINING PROGRAM

## 2021-07-09 NOTE — PROGRESS NOTES
I reviewed with the resident the medical history and the resident's findings on the physical examination. I discussed with the resident the patient's diagnosis and concur with the plan. 29yo  @ 34w3d by LMP c/w 12wk sono   1. IUP: RH pos, CFFDNA wnl, GTT+CBC ok, s/p tdap  2. Hx PTD: after PPROM in [facility] [address] [facility], records unavailable, declined Rachelle after further discussion, normal CLS  3.  HPV pos pap: repeat pap in a year   4. Alpha thal silent carrier: on [employer], offered testing for FOB but he does not live in this country and will try to get tested where he is.       Desires Covid vaccine - will have VDH schedule

## 2021-07-09 NOTE — PATIENT INSTRUCTIONS
Semanas 34 a 36 de solano embarazo: Instrucciones de cuidado  Weeks 34 to 36 of Your Pregnancy: Care Instructions  Instrucciones de cuidado    A estas [place], solano bebé y solano abdomen habrán crecido considerablemente. Lyudmila es [place] de joaquin a sugar. Los pulmones de solano bebé están lyudmila listos para respirar aire. Los huesos de la juan manuel de solano bebé ahora son bastante firmes omer para protegerla zach se mantienen lo suficientemente blandos omer para atravesar el canal de [place]. Es posible que sienta entusiasmo, beto, ansiedad o miedo. Quizá se pregunte cómo se dará cuenta de si está en trabajo de parto o qué esperar en bruce momento. Trate de ser flexible con edyta expectativas respecto del nacimiento. Dado que cada nacimiento es diferente, no hay manera de saber exactamente cómo será solano parto. Esta hoja de cuidados la ayudará a saber qué esperar y cómo prepararse. Le podría facilitar el parto. Si todavía no le cordero aplicado la vacuna Tdap (tétanos, difteria y tos [place]) josee haider [place], hable con solano médico acerca de aplicársela. Zunilda Segun a proteger a solano recién nacido contra la infección por tos ferina. En la semana 36, a la mayoría de las mujeres se les hace raymond prueba de estreptococos del cricket B (GBS, por edyta siglas en inglés). Los estreptococos del cricket B son bacterias comunes que pueden vivir en la vagina y el recto. Pueden hacer que solano bebé se enferme después del parto. Si el resultado es positivo, usted recibirá antibióticos josee el trabajo de [place]. Los medicamentos evitarán que solano bebé contraiga las bacterias. La atención de seguimiento es raymond parte clave de solano tratamiento y seguridad. Asegúrese de hacer y acudir a todas las citas, y llame a solano médico si está teniendo problemas. También es raymond buena idea saber los resultados de edyta exámenes y mantener raymond lista de los medicamentos que arnulfo. ¿Cómo puede cuidarse en el hogar?   Aprenda sobre las alternativas para aliviar el dolor  · El dolor se [place] de modo diferente en cada meka. Hable con solano médico acerca de edyta sentimientos sobre el dolor. · Puede elegir entre varias formas de aliviar el dolor. Estas incluyen medicamentos o técnicas de respiración, así omer medidas para estar cómoda. Usted puede utilizar más de Jonh opción. · Si elige un analgésico (medicamento para el dolor) josee el trabajo de [place], hable con solano médico acerca de edyta opciones. Algunos medicamentos reducen la ansiedad y [facility] a aliviar parte del dolor. Otros adormecen la parte inferior del cuerpo para que no sienta dolor. · Asegúrese de decirle a solano médico acerca de solano elección de analgésico antes de empezar el trabajo de parto o muy temprano en el [place]. Es posible que pueda cambiar de parecer a medida que avanza el [place]. · Geovanna vez se duerme a raymond meka con medicamentos administrados a través de raymond máscara o por vía intravenosa (IV). Trabajo de parto y [place]  · La primera etapa del [place] de parto se divide en sherwin fases: Chema Denis y de transición. ? La mayoría de las mujeres experimentan la fase latente del [place] de parto en edyta hogares. Usted puede [employer] o descansar, comer refrigerios livianos, beber líquidos kiran y comenzar a contar las contracciones. ? Cuando advierta que se le vuelve difícil hablar josee raymond contracción, es posible que esté por pasar a la fase activa. Josee la fase Earlie Blotter, debería ir al hospital si no está allí aún. ? Usted está en la fase activa cuando tiene contracciones cada 3 o 4 minutos y gutierrez alrededor de 60 segundos. El yin uterino comienza a abrirse con más rapidez.  ? Si se le rompe la rhonda, las contracciones serán más intensas y más frecuentes. ? Josee la fase de transición, el yin uterino se estira y las contracciones se producen con Deneice Miah. ? Reggie Romero tenga deseos de pujar, sin embargo es posible que el yin uterino aún no esté preparado.  El [employer] dirá cuándo pujar.  · La segunda etapa comienza cuando el yin uterino se abre por completo y usted está lista para pujar. ? Las contracciones son muy intensas a fin de empujar al bebé por el canal de parto. ? Sentirá la necesidad de pujar. Podría sentir omer si tuviera ganas de evacuar el intestino. ? Aj Miner entrenen a [employer]. Estas contracciones serán muy intensas zach no ocurrirán con tanta frecuencia. Puede descansar un poco entre contracciones. ? Es posible que esté sensible e irritable. Es posible que no se dé cuenta de lo que pasa a solano alrededor. ? Un último esfuerzo y habrá nacido solano bebé. · La tercera etapa ocurre cuando con unas cuantas contracciones más se expulsa la placenta. [place] puede durar 30 minutos o menos. · La cuarta etapa es la de recuperación. Es posible que se sienta abrumada con las emociones y exhausta zach alerta. Judy es un buen momento para comenzar el amamantamiento. ¿Dónde puede encontrar más información en inglés? Vaya a http://www.gray.com/  Kalyn [id number] en la búsqueda para aprender más acerca de \"Semanas 34 a 39 de solano embarazo: Instrucciones de cuidado. \"  Revisado: 8 octubre, 2020               Versión del contenido: 12.8  © 3611-2964 Healthwise, Incorporated. Las instrucciones de cuidado fueron adaptadas bajo licencia por [employer] (which disclaims liability or warranty for this information). Si usted tiene [place] afección médica o sobre estas instrucciones, siempre pregunte a solano profesional de armaan. Healthwise, Incorporated niega toda garantía o responsabilidad por solano uso de esta información.

## 2021-07-09 NOTE — PROGRESS NOTES
Identified pt with two pt identifiers(name and ). Reviewed record in preparation for visit and have obtained necessary documentation. Chief Complaint   Patient presents with    Routine Prenatal Visit     she is 34w 3d today      Vaginal bleeding: no  LOF: no  Abnormal discharge: no  Pain: sometimes has mild lower back and abdominal pain  Contractions: no  Fetal movement: yes   Taking PNV: yes      Health Maintenance Due   Topic    Hepatitis C Screening     COVID-19 Vaccine (1)           Coordination of Care Questionnaire:  :   1) Have you been to an emergency room, urgent care, or hospitalized since your last visit? If yes, where when, and reason for visit? no       2)  Have seen or consulted any other health care provider since your last visit? If yes, where when, and reason for visit?   NO

## 2021-07-10 ENCOUNTER — ANESTHESIA (OUTPATIENT)
Dept: LABOR AND DELIVERY | Age: 31
End: 2021-07-10

## 2021-07-10 ENCOUNTER — ANESTHESIA EVENT (OUTPATIENT)
Dept: LABOR AND DELIVERY | Age: 31
End: 2021-07-10

## 2021-07-10 ENCOUNTER — HOSPITAL ENCOUNTER (INPATIENT)
Age: 31
LOS: 2 days | Discharge: HOME | End: 2021-07-12
Attending: FAMILY MEDICINE | Admitting: OBSTETRICS & GYNECOLOGY

## 2021-07-10 DIAGNOSIS — Z3A.34 34 WEEKS GESTATION OF PREGNANCY: ICD-10-CM

## 2021-07-10 PROBLEM — O42.919 PRETERM PREMATURE RUPTURE OF MEMBRANES: Status: ACTIVE | Noted: 2021-07-10

## 2021-07-10 LAB
A1 MICROGLOB PLACENTAL VAG QL: POSITIVE
BASOPHILS # BLD: 0.1 K/UL (ref 0–0.1)
BASOPHILS NFR BLD: 1 % (ref 0–1)
COMMENT, HOLDF: NORMAL
CONTROL LINE PRESENT?: NORMAL
COVID-19 RAPID TEST, COVR: NOT DETECTED
DAILY QC (YES/NO)?: YES
DIFFERENTIAL METHOD BLD: ABNORMAL
EOSINOPHIL # BLD: 0.1 K/UL (ref 0–0.4)
EOSINOPHIL NFR BLD: 1 % (ref 0–7)
ERYTHROCYTE [DISTWIDTH] IN BLOOD BY AUTOMATED COUNT: 15.1 % (ref 11.5–14.5)
EXPIRATION DATE: NORMAL
HCT VFR BLD AUTO: 38.1 % (ref 35–47)
HGB BLD-MCNC: 12 G/DL (ref 11.5–16)
IMM GRANULOCYTES # BLD AUTO: 0.1 K/UL (ref 0–0.04)
IMM GRANULOCYTES NFR BLD AUTO: 1 % (ref 0–0.5)
INTERNAL NEGATIVE CONTROL: NORMAL
KIT LOT NO.: NORMAL
LYMPHOCYTES # BLD: 1.5 K/UL (ref 0.8–3.5)
LYMPHOCYTES NFR BLD: 20 % (ref 12–49)
MCH RBC QN AUTO: 26.1 PG (ref 26–34)
MCHC RBC AUTO-ENTMCNC: 31.5 G/DL (ref 30–36.5)
MCV RBC AUTO: 82.8 FL (ref 80–99)
MONOCYTES # BLD: 0.5 K/UL (ref 0–1)
MONOCYTES NFR BLD: 6 % (ref 5–13)
NEUTS SEG # BLD: 5.6 K/UL (ref 1.8–8)
NEUTS SEG NFR BLD: 71 % (ref 32–75)
NRBC # BLD: 0 K/UL (ref 0–0.01)
NRBC BLD-RTO: 0 PER 100 WBC
PH, VAGINAL FLUID: 5 (ref 5–6.1)
PLATELET # BLD AUTO: 176 K/UL (ref 150–400)
PMV BLD AUTO: 12.5 FL (ref 8.9–12.9)
RBC # BLD AUTO: 4.6 M/UL (ref 3.8–5.2)
SAMPLES BEING HELD,HOLD: NORMAL
SOURCE, COVRS: NORMAL
WBC # BLD AUTO: 7.9 K/UL (ref 3.6–11)

## 2021-07-10 PROCEDURE — 99285 EMERGENCY DEPT VISIT HI MDM: CPT

## 2021-07-10 PROCEDURE — 77030005513 HC CATH URETH FOL11 MDII -B

## 2021-07-10 PROCEDURE — 76815 OB US LIMITED FETUS(S): CPT | Performed by: OBSTETRICS & GYNECOLOGY

## 2021-07-10 PROCEDURE — 74011000250 HC RX REV CODE- 250: Performed by: ANESTHESIOLOGY

## 2021-07-10 PROCEDURE — 59025 FETAL NON-STRESS TEST: CPT

## 2021-07-10 PROCEDURE — 75810000275 HC EMERGENCY DEPT VISIT NO LEVEL OF CARE

## 2021-07-10 PROCEDURE — 74011250636 HC RX REV CODE- 250/636: Performed by: OBSTETRICS & GYNECOLOGY

## 2021-07-10 PROCEDURE — 77010026065 HC OXYGEN MINIMUM MEDICAL AIR: Performed by: OBSTETRICS & GYNECOLOGY

## 2021-07-10 PROCEDURE — 77030014125 HC TY EPDRL BBMI -B: Performed by: ANESTHESIOLOGY

## 2021-07-10 PROCEDURE — 83986 ASSAY PH BODY FLUID NOS: CPT | Performed by: FAMILY MEDICINE

## 2021-07-10 PROCEDURE — 84112 EVAL AMNIOTIC FLUID PROTEIN: CPT | Performed by: FAMILY MEDICINE

## 2021-07-10 PROCEDURE — 75410000003 HC RECOV DEL/VAG/CSECN EA 0.5 HR: Performed by: OBSTETRICS & GYNECOLOGY

## 2021-07-10 PROCEDURE — 75410000002 HC LABOR FEE PER 1 HR: Performed by: OBSTETRICS & GYNECOLOGY

## 2021-07-10 PROCEDURE — 00HU33Z INSERTION OF INFUSION DEVICE INTO SPINAL CANAL, PERCUTANEOUS APPROACH: ICD-10-PCS | Performed by: OBSTETRICS & GYNECOLOGY

## 2021-07-10 PROCEDURE — 85025 COMPLETE CBC W/AUTO DIFF WBC: CPT

## 2021-07-10 PROCEDURE — 65270000029 HC RM PRIVATE

## 2021-07-10 PROCEDURE — 3E033VJ INTRODUCTION OF OTHER HORMONE INTO PERIPHERAL VEIN, PERCUTANEOUS APPROACH: ICD-10-PCS | Performed by: OBSTETRICS & GYNECOLOGY

## 2021-07-10 PROCEDURE — 74011000258 HC RX REV CODE- 258: Performed by: STUDENT IN AN ORGANIZED HEALTH CARE EDUCATION/TRAINING PROGRAM

## 2021-07-10 PROCEDURE — 74011250636 HC RX REV CODE- 250/636: Performed by: ANESTHESIOLOGY

## 2021-07-10 PROCEDURE — 36415 COLL VENOUS BLD VENIPUNCTURE: CPT

## 2021-07-10 PROCEDURE — 10D17Z9 MANUAL EXTRACTION OF PRODUCTS OF CONCEPTION, RETAINED, VIA NATURAL OR ARTIFICIAL OPENING: ICD-10-PCS | Performed by: OBSTETRICS & GYNECOLOGY

## 2021-07-10 PROCEDURE — 2709999900 HC NON-CHARGEABLE SUPPLY

## 2021-07-10 PROCEDURE — 75410000000 HC DELIVERY VAGINAL/SINGLE: Performed by: OBSTETRICS & GYNECOLOGY

## 2021-07-10 PROCEDURE — 87635 SARS-COV-2 COVID-19 AMP PRB: CPT

## 2021-07-10 PROCEDURE — 74011250636 HC RX REV CODE- 250/636: Performed by: STUDENT IN AN ORGANIZED HEALTH CARE EDUCATION/TRAINING PROGRAM

## 2021-07-10 PROCEDURE — 76060000078 HC EPIDURAL ANESTHESIA: Performed by: ANESTHESIOLOGY

## 2021-07-10 RX ORDER — OXYTOCIN/RINGER'S LACTATE 30/500 ML
0-20 PLASTIC BAG, INJECTION (ML) INTRAVENOUS
Status: DISCONTINUED | OUTPATIENT
Start: 2021-07-10 | End: 2021-07-12 | Stop reason: HOSPADM

## 2021-07-10 RX ORDER — NALBUPHINE HYDROCHLORIDE 10 MG/ML
10 INJECTION, SOLUTION INTRAMUSCULAR; INTRAVENOUS; SUBCUTANEOUS
Status: DISCONTINUED | OUTPATIENT
Start: 2021-07-10 | End: 2021-07-12 | Stop reason: HOSPADM

## 2021-07-10 RX ORDER — SODIUM CHLORIDE, SODIUM LACTATE, POTASSIUM CHLORIDE, CALCIUM CHLORIDE 600; 310; 30; 20 MG/100ML; MG/100ML; MG/100ML; MG/100ML
125 INJECTION, SOLUTION INTRAVENOUS CONTINUOUS
Status: DISCONTINUED | OUTPATIENT
Start: 2021-07-10 | End: 2021-07-10 | Stop reason: SDUPTHER

## 2021-07-10 RX ORDER — SODIUM CHLORIDE 0.9 % (FLUSH) 0.9 %
5-40 SYRINGE (ML) INJECTION EVERY 8 HOURS
Status: DISCONTINUED | OUTPATIENT
Start: 2021-07-10 | End: 2021-07-12 | Stop reason: HOSPADM

## 2021-07-10 RX ORDER — LIDOCAINE HYDROCHLORIDE 10 MG/ML
INJECTION INFILTRATION; PERINEURAL AS NEEDED
Status: DISCONTINUED | OUTPATIENT
Start: 2021-07-10 | End: 2021-07-10 | Stop reason: HOSPADM

## 2021-07-10 RX ORDER — BUPIVACAINE HYDROCHLORIDE 2.5 MG/ML
INJECTION, SOLUTION EPIDURAL; INFILTRATION; INTRACAUDAL AS NEEDED
Status: DISCONTINUED | OUTPATIENT
Start: 2021-07-10 | End: 2021-07-10 | Stop reason: HOSPADM

## 2021-07-10 RX ORDER — LIDOCAINE HYDROCHLORIDE AND EPINEPHRINE 15; 5 MG/ML; UG/ML
INJECTION, SOLUTION EPIDURAL AS NEEDED
Status: DISCONTINUED | OUTPATIENT
Start: 2021-07-10 | End: 2021-07-10 | Stop reason: HOSPADM

## 2021-07-10 RX ORDER — SODIUM CHLORIDE, SODIUM LACTATE, POTASSIUM CHLORIDE, CALCIUM CHLORIDE 600; 310; 30; 20 MG/100ML; MG/100ML; MG/100ML; MG/100ML
125 INJECTION, SOLUTION INTRAVENOUS CONTINUOUS
Status: DISCONTINUED | OUTPATIENT
Start: 2021-07-10 | End: 2021-07-12 | Stop reason: HOSPADM

## 2021-07-10 RX ORDER — EPHEDRINE SULFATE/0.9% NACL/PF 50 MG/5 ML
10 SYRINGE (ML) INTRAVENOUS
Status: COMPLETED | OUTPATIENT
Start: 2021-07-10 | End: 2021-07-10

## 2021-07-10 RX ORDER — FENTANYL/BUPIVACAINE/NS/PF 2-1250MCG
1-16 PREFILLED PUMP RESERVOIR EPIDURAL CONTINUOUS
Status: DISCONTINUED | OUTPATIENT
Start: 2021-07-10 | End: 2021-07-12 | Stop reason: HOSPADM

## 2021-07-10 RX ORDER — SODIUM CHLORIDE 0.9 % (FLUSH) 0.9 %
5-40 SYRINGE (ML) INJECTION AS NEEDED
Status: DISCONTINUED | OUTPATIENT
Start: 2021-07-10 | End: 2021-07-12 | Stop reason: HOSPADM

## 2021-07-10 RX ORDER — NALBUPHINE HYDROCHLORIDE 10 MG/ML
INJECTION, SOLUTION INTRAMUSCULAR; INTRAVENOUS; SUBCUTANEOUS
Status: DISPENSED
Start: 2021-07-10 | End: 2021-07-11

## 2021-07-10 RX ORDER — EPHEDRINE SULFATE/0.9% NACL/PF 50 MG/5 ML
SYRINGE (ML) INTRAVENOUS
Status: DISPENSED
Start: 2021-07-10 | End: 2021-07-11

## 2021-07-10 RX ADMIN — SODIUM CHLORIDE, POTASSIUM CHLORIDE, SODIUM LACTATE AND CALCIUM CHLORIDE 125 ML/HR: 600; 310; 30; 20 INJECTION, SOLUTION INTRAVENOUS at 06:45

## 2021-07-10 RX ADMIN — SODIUM CHLORIDE, POTASSIUM CHLORIDE, SODIUM LACTATE AND CALCIUM CHLORIDE 999 ML/HR: 600; 310; 30; 20 INJECTION, SOLUTION INTRAVENOUS at 20:00

## 2021-07-10 RX ADMIN — LIDOCAINE HYDROCHLORIDE AND EPINEPHRINE 3 ML: 15; 5 INJECTION, SOLUTION EPIDURAL at 20:20

## 2021-07-10 RX ADMIN — SODIUM CHLORIDE 5 MILLION UNITS: 900 INJECTION INTRAVENOUS at 08:01

## 2021-07-10 RX ADMIN — NALBUPHINE HYDROCHLORIDE 10 MG: 10 INJECTION, SOLUTION INTRAMUSCULAR; INTRAVENOUS; SUBCUTANEOUS at 18:10

## 2021-07-10 RX ADMIN — LIDOCAINE HYDROCHLORIDE 5 ML: 10 INJECTION, SOLUTION INFILTRATION; PERINEURAL at 20:14

## 2021-07-10 RX ADMIN — Medication 10 MG: at 22:48

## 2021-07-10 RX ADMIN — SODIUM CHLORIDE 2.5 MILLION UNITS: 9 INJECTION, SOLUTION INTRAVENOUS at 16:15

## 2021-07-10 RX ADMIN — Medication 10 ML: at 07:00

## 2021-07-10 RX ADMIN — SODIUM CHLORIDE, POTASSIUM CHLORIDE, SODIUM LACTATE AND CALCIUM CHLORIDE 500 ML: 600; 310; 30; 20 INJECTION, SOLUTION INTRAVENOUS at 22:47

## 2021-07-10 RX ADMIN — SODIUM CHLORIDE 2.5 MILLION UNITS: 9 INJECTION, SOLUTION INTRAVENOUS at 12:24

## 2021-07-10 RX ADMIN — BUPIVACAINE HYDROCHLORIDE 5 ML: 2.5 INJECTION, SOLUTION EPIDURAL; INFILTRATION; INTRACAUDAL; PERINEURAL at 21:14

## 2021-07-10 RX ADMIN — OXYTOCIN 2 MILLI-UNITS/MIN: 10 INJECTION, SOLUTION INTRAMUSCULAR; INTRAVENOUS at 09:08

## 2021-07-10 RX ADMIN — BUPIVACAINE HYDROCHLORIDE 7.5 ML: 2.5 INJECTION, SOLUTION EPIDURAL; INFILTRATION; INTRACAUDAL; PERINEURAL at 20:23

## 2021-07-10 RX ADMIN — Medication 12 ML/HR: at 20:49

## 2021-07-10 NOTE — H&P
[address] [facility] [address] [place] [address]   Office [phone], Fax [phone]      History & Physical    Name: Marquita Zhu MRN: [de-identified]  SSN: [id number]    YOB: 1990  Age: 33 y.o. Sex: female      Subjective:     Reason for Admission:  Pregnancy and PPROM    History of Present Illness: Ms. Lunette Litten is a 33 y.o.   female with an estimated gestational age of 31w1d with Estimated Date of Delivery: 21. Patient presents c/o leakage of fluid and reports that she felt the urge to pee at 0325. When she stood up she leaked a large amount of clear fluid and noticed small amount of blood on the toilet paper when she got to the bathroom. She reports good fetal movement. Reports mild back pain without other complaints. Denies feeling contractions. No headaches. No vision changes. No n/v. Denies swelling in the legs. Patient reports h/o PPROM in first pregnancy and delivered first child at 26w3d in [facility]. OB History    Para Term  AB Living   2 1 0 1 0 1   SAB TAB Ectopic Molar Multiple Live Births                    # Outcome Date GA Lbr Jason/2nd Weight Sex Delivery Anes PTL Lv   2 Current            1  14 34w3d  2.381 kg           Obstetric Comments   6     Past Medical History:   Diagnosis Date    Abnormal Papanicolaou smear of cervix     Anemia      delivery      No past surgical history on file.   Social History     Occupational History    Not on file   Tobacco Use    Smoking status: Never Smoker    Smokeless tobacco: Never Used   Substance and Sexual Activity    Alcohol use: Not Currently    Drug use: Never    Sexual activity: Yes     Partners: Male      Family History   Problem Relation Age of Onset    Hypertension Mother     Hypertension Maternal Aunt     Diabetes Maternal Aunt     Hypertension Paternal Grandmother     Diabetes Paternal Grandmother     Hypertension Paternal Grandfather     Diabetes Paternal Grandfather        No Known Allergies  Prior to Admission medications    Medication Sig Start Date End Date Taking? Authorizing Provider   prenatal vit-iron fumarate-fa (PRENATAL PLUS with IRON) 28 mg iron- 800 mcg tab Take 1 Tablet by mouth daily. Indications: pregnancy 21  Yes Lexi Morton DO   ferrous sulfate (Iron) 325 mg (65 mg iron) tablet Take 1 Tab by mouth three (3) times daily (with meals). 21  Yes Annette Charles MD   ergocalciferol (ERGOCALCIFEROL) 1,250 mcg (50,000 unit) capsule Take 1 Cap by mouth every seven (7) days. 21  Yes Annette Charles MD        Review of Systems:  Pertinent items are noted in the History of Present Illness. Objective:     Vitals:    Vitals:    07/10/21 0521 07/10/21 0543   BP: 109/68    Pulse: 100    Resp: (P) 18    Temp: (P) 98.7 °F (37.1 °C)    Weight:  80.3 kg (177 lb)   Height:  4' 11.84\" (1.52 m)      Temp (24hrs), Av.2 °F (36.8 °C), Min:97.6 °F (36.4 °C), Max:98.7 °F (37.1 °C)    BP  Min: 100/65  Max: 109/68     Physical Exam:  Patient without distress. Heart: Regular rate and rhythm, S1S2 present or without murmur or extra heart sounds  Lung: clear to auscultation throughout lung fields, no wheezes, no rales, no rhonchi and normal respiratory effort  Abdomen: gravid abdomen, nontender to palpation  Fundus: firm and non tender  Cervical Exam: /-3 per Lonny Cannon RN   Membranes:  Premature Rupture of Membranes;  Amniotic Fluid: clear fluid  Uterine Activity: no ctx  Fetal Heart Rate:  Baseline: 140 per minute  Variability: moderate  Accelerations: yes  Decelerations: none       U/s: cephalic presentation           Lab/Data Review:  Recent Results (from the past 24 hour(s))   VITAMIN D, 25 HYDROXY    Collection Time: 21 11:17 AM   Result Value Ref Range    Vitamin D 25-Hydroxy 42.6 30 - 100 ng/mL       Assessment and Plan:     Ms. Katherin Mcgregor is a 33 y.o.   female with an estimated gestational age of 31w1d who is admitted for PPROM. Current pregnancy has been complicated by h/o PTD, HPV+ pap, alpha thal silent carrier status, and anemia. PPROM: Patient has h/o PPROM (2014). Declined [place]. - admission for IOL. Will start Pitocin.   - PCN for GBS ppx  - BMP pending   - FHR/Echelon monitor. Anemia: Last Hgb 9.9. On prenatal iron. Alpha thal silent carrier: positive on Horizon. HPV+: Had HPV pos pap   - repeat pap in one year       I have discussed the diagnosis with the patient and the intended plan as seen in the above orders. All questions were answered concerning future plans. I have discussed medication side effects and warnings with the patient as well. Labor precautions discussed, including: Regular painful contractions, lasting for greater than one hour, taking your breath away; any vaginal bleeding; any leakage of fluid; or absent or decreased fetal movement. Call M.D. on call if any of these symptoms or signs occur. Patient discussed with Dr. Gibson.      Cliff Jones MD  Family Medicine Resident

## 2021-07-10 NOTE — PROGRESS NOTES
Pt seen and examined by me. Feeling very uncomfortable with contractions.     AVSS  SVE: /-1  FHT: 140s with accels, moderate variability  [place]: Q2-3      A/ PPROM making slow progress    P/ expect , continue PCN and pitocin

## 2021-07-10 NOTE — PROGRESS NOTES
Problem: Patient Education: Go to Patient Education Activity  Goal: Patient/Family Education  Outcome: Progressing Towards Goal     Problem: Vaginal Delivery: Day of Deliver-Laboring  Goal: *Optimal pain control at patient's stated goal  Outcome: Not Progressing Towards Goal

## 2021-07-10 NOTE — PROGRESS NOTES
1905  Bedside and Verbal shift change report given to DIMITRIS Raya RN (oncoming nurse) by Georgie Aguirre RN (offgoing nurse). Report included the following information SBAR, Kardex, Intake/Output, MAR, Accordion, Recent Results and Med Rec Status. Patient assessment completed. Patient desires an epidural at this time. Fluid bolus started. 2010  Dr Tegan Sawant is at bedside assessing and educating the patient about the epidural  2013  Time Out  2020  Test Dose  2035  Nguyễn catheter placed  2040  SVE 4/90/-1  2045  Dr Tegan Sawant notified of patient not getting any relieve. 2100  Dr Tegan Sawant at bedside assessing pain control and the epidural.  Epidural will be replaced. 2103  Time Out.  2108  Test Dose  2156   Early FHR decelerations noted. Patient turned onto lateral left   Dr Leonardo Santos at bedside. Patient repositioned into trendelenburg. 2200  SVE by Resident MD Dr Leonardo Santos, 8/100/-1  [facility]  Dr Jacquelyn Farah is at bedside assessing patient progress and the strip. 2204  Pitocin turned off. Per MD, Re-Start at 2300 at 6 stephany units/h.  2208 Amnoinfusion BOLUS 300 ml started, maintenance 150 ml/h will be continuously administered after the bolus. Patient repositioned onto hands and knees. 2226 Repositioned onto lateral right.'  2242  Low BP noted. Ephedrine pulled Dr Tegan Sawant called  50  Ml fluid bolus  2248  Ephedrine administered  2302  Pitocin re-started  2321  SVE  9/100/0  Dr Jacquelyn Farah notified. Per MD patient will start pushing, as anterior lip is reduced. 2327  Dr Jacquelyn Farah and Resident MD at bedside ready for delivery. 2331  Patient educated on how to push. Patient repositioned into stirups, and she is baring down with each contraction while both MD at bedside pushing with patient. NICU called for delivery. 46  Viable male infant born. 2354  Nguyễn catheter removed  2359  Placenta delivered. Resident MD Leonardo Santos is performing a manual sweep  0000  First fundal check. Fresh linens and gown.    ml, TOTAL  ml  0135 Epidural catheter removed. Back care  0300  Patient ambulates to bathroom and voids 1,000 ml.  0600  Patient ambulates to bathroom and voids 300 ml.  0710  Bedside and Verbal shift change report given to Jacqueline Mascorro RN (oncoming nurse) by Keyanna Cho RN (offgoing nurse). Report included the following information SBAR, Kardex, Procedure Summary, Intake/Output, MAR, Accordion, Recent Results and Med Rec Status.

## 2021-07-10 NOTE — PROGRESS NOTES
I saw and talked to the patient with an . She presents with PPROM at 34 weeks. We discussed  corticosteroids, benefits (a decrease in pulmonary morbidity), and risks (increase in hypoglycemia and possibly interferes with neurodevelopment). We decided not to administer them. We also discussed expectant management versus active management with induction of labor, including benefits and risks of each, including fetal prematurity, infection, and abruption. I recommend induction of labor and she agrees, will start oxytocin. We discussed prophylaxis for GBS with IV penicillin, she agrees. I saw the patient after the resident did.

## 2021-07-10 NOTE — PROGRESS NOTES
5829  Patient arrives to L&D reporting a large gush of clear fluids mixed with a little bit of blood at 0325. Patient denies painful contractions. Patient has been oriented to room and unit. Patient changed into gown. EFM and TOCO placed. 0518  NITRAZINE unable to have enough fluid. [facility]  Resident MD notified. Santino  Resident MD at bedside assessing the patient. US performed to confirm vertex. 0715  Bedside and Verbal shift change report given to Lisa Simon RN (oncoming nurse) by Veronica Ag RN (offgoing nurse). Report included the following information SBAR, Kardex, Intake/Output, MAR, Accordion, Recent Results and Med Rec Status.

## 2021-07-10 NOTE — PROGRESS NOTES
4759 Dr Gibson at bedside to evaluate pt.  [id number] used to translate. 0800 Assessment done. Plan of care discussed. Benefits and risks of pitocin administration discussed with pt. Pt verbalized understanding. Call bell within reach.  [id number] used to translate. 200 Dr Haley Peña at bedside to evaluate pt. SVE 3-4/60/-2.   Orders received for Nubain 10mg ivp

## 2021-07-10 NOTE — PROGRESS NOTES
Labor Progress Note  Patient seen, fetal heart rate and contraction pattern evaluated, patient examined. Patient reports mild discomfort during contractions. Physical Exam:  SVE /-3 (per nurse 0364)   Membranes:  AROM, clear fluid  Uterine Activity: irregular, ctx q 2-4 minutes   Fetal Heart Rate:   Baseline: 140 per minute  Variability: moderate  Accelerations: yes  Decelerations: none     Assessment/Plan:   Misael Pantoja is a 34 yo  at 34w4 admitted for PPROM. Amnisure positive at [facility]. Pregnancy has been complicated by h/o PTD, HPV+ pap, alpha thal silent carrier status, and anemia. Anticipate . PPROM: h/o PPROM (). Declined [place].   -Pit infusing   -PCN for GBS ppx   -FHR/toco monitor   -NICU consult for pending  delivery     Anemia: Last Hgb 9.9. On prenatal iron     Alpha thal silent carrier: Positive on Horizon     HPV+: Had HPV+ pap  - repeat pap in one year     Patient discussed with Dr. Waqar Snell.    Cedric Bedolla MD  Family Medicine Resident

## 2021-07-10 NOTE — PROGRESS NOTES
Labor Progress Note  Patient seen, fetal heart rate and contraction pattern evaluated, patient examined. Patient reports increased discomfort with contractions. Per nurse, patient has started to have intermittent variable decelerations. Physical Exam:  SVE: /-2   Cervical exam done byFranklin Oneill RN   Membranes: AROM, clear fluid   Uterine Activity: moderate, irregular ctx q 3-4 minutes  Fetal Heart Rate: Baseline: 140 per minute  Variability: moderate  Accelerations: yes  Decelerations: intermittent variable       PPROM: h/o PPROM (). Declined [place].   -Pit infusing   -PCN for GBS ppx   -FHR/toco monitor   -NICU consult for pending  delivery   -attempt repositioning patient due to intermittent variable decelerations    Anemia: last Hgb 9.9. On iron throughout pregnancy.      Alpha thal silent carrier: positive on Horizon     HPV+: Had HPV+ pap   -repeat pap in one year    Patient discussed with Dr. Flory Ramires MD  Family Medicine Resident

## 2021-07-10 NOTE — CONSULTS
Neonatology Antepartum Consultation    Name: Haris Arizmendi [facility] Record Number: [id number]   YOB: 1990  Today's Date: July 10, 2021                                                                 Date of Consultation:  July 10, 2021  Time: 2:46 PM  NICU Provider: JACI Lugo  Referring Physician: Javi Escalante MD  Admission Diagnoses:  premature rupture of membranes [O42.919]  Reason for Consultation:  PPROM and PTL        Objective:     Age: 33 y.o.  Therese Shin  Para:   Gestation age: 31w1d    Rupture of Membrane: yes     Maternal steroids:  no     Medications:   Current Facility-Administered Medications   Medication Dose Route Frequency    sodium chloride (NS) flush 5-40 mL  5-40 mL IntraVENous Q8H    sodium chloride (NS) flush 5-40 mL  5-40 mL IntraVENous PRN    penicillin G pot (PFIZERPEN) 2.5 Million Units in 50 ml 0.9% NaCl  2.5 Million Units IntraVENous Q4H    lactated Ringers infusion  125 mL/hr IntraVENous CONTINUOUS    oxytocin (PITOCIN) 30 units/500 ml LR  0-20 stephany-units/min IntraVENous TITRATE         Data Review:   Prenatal Labs:     Lab Results   Component Value Date/Time    ABO/Rh(D) B POSITIVE 2021 09:38 AM         Last Ultrasound:  Last Estimated Fetal Weight:      Assessment:     Active Problems:     premature rupture of membranes (7/10/2021)         Concerns/Plan:     Approximate survival statistics in overall population at this Gestation Age     Excellent  []    Explained limitation of statistics to parents    Common problems at this Gestation Age:   RDS, hypoglycemia, thermoregulation, jaundice, sepsis     However, not limited to the above. [x]    Explained NICU coverage and team approach  [x]    Answered questions  []    Offered tour  []    Obtained consents  [x]    Discussed Benefits of Breast Feeding    Summary: Sinan Fletcher is 33 y.o., , at 31w1d gestation, with male fetus.   Fetal monitoring stable per H&P. Maternal labs unremarkable, GBS pending. Maternal hx significant for previous 34 week infant, HPV positive, alpha thallessemia silent carrier, PROM and PTL. She and two other family members/friends are present for consult. They are very pleasant and ask appropriate questions which were answered / acknowledged. Explained the above to them in addition to discussion about anticipated length of stay and potential long term outcomes. Mother intends to provide breast milk and benefits of such explained. Discussed 24/7 visitation. Thank you for the opportunity to consult them and please don't hesitate to call with questions / concerns. 40 minutes spent on all aspects of care including face to face discussion, reviewing labs, H&P, maternal hx, relevant literature, and creating note. Of this time,  > 50% was spent face to face with patient and on floor.       Signed: IVETTE Rutherford  Date/Time: 7/10/2021 at 2:46 PM

## 2021-07-10 NOTE — PROGRESS NOTES
Labor Progress Note  Patient seen, fetal heart rate and contraction pattern evaluated, patient examined. Patient Vitals for the past 1 hrs:   BP Temp Pulse Resp   07/10/21 1711 114/71 (P) 98.4 °F (36.9 °C) 80 (P) 16       Physical Exam:  SVE /-2  Cervical exam done by? Dr. Darryle Pilon   Membranes:  AROM  Uterine activity: ctx q 2-3 minutes   Fetal Heart Rate: Baseline: 140 per minute  Variability: moderate  Accelerations: yes  Decelerations: intermittent variable    PPROM: h/o PPROM (). Declined [place].   -Pit infusing   -PCN for GBS ppx   -FHR/toco monitor   -NICU consult for pending  delivery   -Anticipate      Anemia: last Hgb 9.9.  On iron throughout pregnancy.      Alpha thal silent carrier: positive on Horizon      HPV+: Had HPV+ pap   -repeat pap in one year     Patient discussed with Dr. Cortes Campbell MD  Family Medicine Resident

## 2021-07-11 LAB
BACTERIA SPEC CULT: NORMAL
SERVICE CMNT-IMP: NORMAL

## 2021-07-11 PROCEDURE — 65270000029 HC RM PRIVATE

## 2021-07-11 PROCEDURE — 74011250636 HC RX REV CODE- 250/636: Performed by: STUDENT IN AN ORGANIZED HEALTH CARE EDUCATION/TRAINING PROGRAM

## 2021-07-11 PROCEDURE — 74011250637 HC RX REV CODE- 250/637: Performed by: STUDENT IN AN ORGANIZED HEALTH CARE EDUCATION/TRAINING PROGRAM

## 2021-07-11 RX ORDER — IBUPROFEN 800 MG/1
800 TABLET ORAL EVERY 8 HOURS
Status: DISCONTINUED | OUTPATIENT
Start: 2021-07-11 | End: 2021-07-12 | Stop reason: HOSPADM

## 2021-07-11 RX ORDER — OXYTOCIN/RINGER'S LACTATE 30/500 ML
10 PLASTIC BAG, INJECTION (ML) INTRAVENOUS AS NEEDED
Status: DISCONTINUED | OUTPATIENT
Start: 2021-07-11 | End: 2021-07-12 | Stop reason: HOSPADM

## 2021-07-11 RX ORDER — SIMETHICONE 80 MG
80 TABLET,CHEWABLE ORAL
Status: DISCONTINUED | OUTPATIENT
Start: 2021-07-11 | End: 2021-07-12 | Stop reason: HOSPADM

## 2021-07-11 RX ORDER — DOCUSATE SODIUM 100 MG/1
100 CAPSULE, LIQUID FILLED ORAL
Status: DISCONTINUED | OUTPATIENT
Start: 2021-07-11 | End: 2021-07-12 | Stop reason: HOSPADM

## 2021-07-11 RX ORDER — ACETAMINOPHEN 325 MG/1
650 TABLET ORAL
Status: DISCONTINUED | OUTPATIENT
Start: 2021-07-11 | End: 2021-07-12 | Stop reason: HOSPADM

## 2021-07-11 RX ORDER — ONDANSETRON 4 MG/1
4 TABLET, ORALLY DISINTEGRATING ORAL
Status: ACTIVE | OUTPATIENT
Start: 2021-07-11 | End: 2021-07-12

## 2021-07-11 RX ORDER — OXYTOCIN/RINGER'S LACTATE 30/500 ML
87.3 PLASTIC BAG, INJECTION (ML) INTRAVENOUS AS NEEDED
Status: COMPLETED | OUTPATIENT
Start: 2021-07-11 | End: 2021-07-11

## 2021-07-11 RX ADMIN — ACETAMINOPHEN 650 MG: 325 TABLET ORAL at 20:55

## 2021-07-11 RX ADMIN — IBUPROFEN 800 MG: 800 TABLET, FILM COATED ORAL at 01:36

## 2021-07-11 RX ADMIN — OXYTOCIN 87.3 MILLI-UNITS/MIN: 10 INJECTION, SOLUTION INTRAMUSCULAR; INTRAVENOUS at 01:00

## 2021-07-11 RX ADMIN — IBUPROFEN 800 MG: 800 TABLET, FILM COATED ORAL at 15:08

## 2021-07-11 NOTE — ANESTHESIA PREPROCEDURE EVALUATION
Relevant Problems   HEMATOLOGY   (+) Anemia during pregnancy in second trimester       Anesthetic History   No history of anesthetic complications            Review of Systems / Medical History  Patient summary reviewed and pertinent labs reviewed    Pulmonary  Within defined limits                 Neuro/Psych   Within defined limits           Cardiovascular  Within defined limits                     GI/Hepatic/Renal  Within defined limits              Endo/Other  Within defined limits           Other Findings   Comments: 34 weeks PPROM  Active labor requesting epidural           Physical Exam    Airway  Mallampati: II           Cardiovascular               Dental         Pulmonary                 Abdominal         Other Findings            Anesthetic Plan    ASA: 2  Anesthesia type: epidural          Induction: Intravenous  Anesthetic plan and risks discussed with: Patient       used

## 2021-07-11 NOTE — L&D DELIVERY NOTE
Delivery Summary    Patient: Winston Lawrence MRN: [de-identified]  SSN: [id number]    YOB: 1990  Age: 33 y.o. Sex: female       Patient progressed well to C/C/+2 and pushed for approximately 20 min w/  over an intact of a liveborn male infant, Apgar scores were 9/9. Head delivered slightly BRET. Loose cord wrapped around the left leg x2 manually reduced. Anterior left  shoulder delivered w/ single maternal effort w/ posterior shoulder and body following easily. Infant placed on maternal abdomen. Delayed cord clamping x 1 min. Cord clamped x 2 and cut by FOB. Pitocin infusion initiated. Placenta delivered spontaneously partially intact w/ 3 v cord. Remainder of placenta was manually extracted from the uterus by sweep from Dr. Leia Walker (attending). Perineum inspected. Fundus firm at U. Mother and baby bonding in LDR. Delivery . Information for the patient's :  Alba Rollins, Male [place] [de-identified]       Labor Events:    Labor: Yes    Steroids: None   Cervical Ripening Date/Time:       Cervical Ripening Type:     Antibiotics During Labor:     Rupture Identifier:      Rupture Date/Time: 7/10/2021 3:25 AM   Rupture Type: PPROM   Amniotic Fluid Volume:      Amniotic Fluid Description: Clear    Amniotic Fluid Odor:      Induction: Oxytocin       Induction Date/Time: 7/10/2021 9:08 AM    Indications for Induction:      Augmentation: None   Augmentation Date/Time:      Indications for Augmentation:     Labor complications:          Additional complications:        Delivery Events:  Indications For Episiotomy:     Episiotomy: None   Perineal Laceration(s): None   Repaired:     Periurethral Laceration Location:      Repaired:     Labial Laceration Location:     Repaired:     Sulcal Laceration Location:     Repaired:     Vaginal Laceration Location:     Repaired:     Cervical Laceration Location:     Repaired:     Repair Suture: None   Number of Repair Packets: Estimated Blood Loss (ml):  ml   Quantitative Blood Loss (ml)                Delivery Date: 7/10/2021    Delivery Time: 11:50 PM  Delivery Type: Vaginal, Spontaneous  Sex:  Male    Gestational Age: 34w4d   Delivery Clinician:  Keena Pardo  Living Status: Living   Delivery Location:              APGARS  One minute Five minutes Ten minutes   Skin color: 1   1        Heart rate: 2   2        Grimace: 2   2        Muscle tone: 2   2        Breathin   2        Totals: 9   9            Presentation: Vertex    Position: Left Occiput Anterior  Resuscitation Method:  Suctioning-bulb; Tactile Stimulation     Meconium Stained: None      Cord Information: 3 Vessels  Complications: Nuchal Cord Without Compressions  Cord around: right upper extremity  Delayed cord clamping? Yes  Cord clamped date/time:7/10/2021 11:51 PM  Disposition of Cord Blood: Discard    Blood Gases Sent?: No    Placenta:  Date/Time: 7/10/2021 11:59 PM  Removal: Expressed      Appearance: Normal     [place] Measurements:  Birth Weight:        Birth Length:        Head Circumference:        Chest Circumference:       Abdominal Girth: Other Providers:   Precious LEMONS;CHELLY HARTMANN;WILLY ESQUIVEL;MINERVA TINSLEY;MAT PATTERSON;TYLOR ROTHMAN;MERON HAYDEN;ALI, MOHSIN, Obstetrician;Primary Nurse;Nicu Nurse;Neonatologist;Anesthesiologist;Resident; Charge Nurse;Resident; Respiratory Therapist           Group B Strep: No results found for: SKYLER Moreno  Information [facility] the patient's :  [facility], Male Frantz [place] [de-identified]   No results found for: ABORH, PCTABR, PCTDIG, BILI, ABORHEXT, ABORH     No results for input(s): PCO2CB, PO2CB, HCO3I, SO2I, IBD, PTEMPI, SPECTI, PHICB, ISITE, IDEV, IALLEN in the last 72 hours.

## 2021-07-11 NOTE — PROGRESS NOTES
Labor Progress Note  Patient seen, fetal heart rate and contraction pattern evaluated, patient examined having deep variables   Patient Vitals for the past 1 hrs:   BP Temp Pulse Resp SpO2   07/10/21 2143     93 %   07/10/21 2138     96 %   07/10/21 2133     99 %   07/10/21 2131 (!) 90/58 98.3 °F (36.8 °C) 79 16 99 %   07/10/21 2129 (!) 86/55  94     07/10/21 2128     99 %   07/10/21 2127 103/66  88     07/10/21 2125 (!) 96/58  84     07/10/21 2123 (!) 96/56  88  99 %   07/10/21 2121 (!) 100/57  82     07/10/21 2119 (!) 99/56  84     07/10/21 2118     99 %   07/10/21 2117 99/60  85     07/10/21 2115 107/62  80     07/10/21 2113     97 %   07/10/21 2111   86  90 %   07/10/21 2110 124/75       07/10/21 2108     100 %   07/10/21 2103     99 %       Physical Exam:  SVE 8/100/-1  Cervical exam done by? Nurse  & me (confirmed by Dr. Abdirahman Ambrocio)   Membranes:  AROM  Uterine activity: was ctx q 2-3 minutes  Fetal Heart Rate: Baseline: 140 per minute  Variability: moderate  Accelerations: yes  Decelerations: deep variables     PPROM: h/o PPROM (). Declined [place].   -Pit infusing, titrate as appropriate   -PCN for GBS ppx   -FHR/toco monitor   -NICU consult for pending  delivery   -Anticipate   -IUPC placed by me for amnioinfusion  (Dr. Abdirahman Ambrocio present)      Anemia: POA 12.0.  On iron throughout pregnancy.      Alpha thal silent carrier: positive on Horizon      HPV+: Had HPV+ pap   -repeat pap in one year     Patient discussed with Dr. Abdirahman Ambrocio (ob attending)     Arminda Hernandes DO  Family Medicine Resident PGY3

## 2021-07-11 NOTE — ANESTHESIA PROCEDURE NOTES
Epidural Block    Patient location during procedure: OB  Start time: 7/10/2021 8:13 PM  End time: 7/10/2021 8:23 PM  Reason for block: labor epidural  Staffing  Performed: attending   Anesthesiologist: Santa Hill MD  Preanesthetic Checklist  Completed: patient identified, IV checked, site marked, risks and benefits discussed, surgical consent, monitors and equipment checked, pre-op evaluation and timeout performed  Block Placement  Patient position: sitting  Prep: ChloraPrep  Sterility prep: cap, drape, gloves, hand and mask  Sedation level: no sedation  Patient monitoring: heart rate and continuous pulse oximetry  Approach: midline  Location: lumbar  Lumbar location: L2-L3  Epidural  Loss of resistance technique: saline  Guidance: landmark technique  Needle  Needle type: Tuohy   Needle gauge: 17 G  Needle length: 9 cm  Catheter type: multi-orifice  Catheter size: 20 G  Catheter securement method: surgical tape, clear occlusive dressing and liquid medical adhesive  Test dose: negative  Assessment  Block outcome: pain improved  Number of attempts: 2  Procedure assessment: patient tolerated procedure well with no immediate complications

## 2021-07-11 NOTE — PROGRESS NOTES
Labor Progress Note  Patient seen, fetal heart rate and contraction pattern evaluated, patient examined. Currently getting epidural placed at this time (anesthesia in the room)   No data found. Physical Exam:  SVE /-2   Cervical exam done by? Nurse   Membranes:  AROM  Uterine activity: was ctx q 2-3 minutes, toco displaced while getting epidural   Fetal Heart Rate: Baseline: 140 per minute  Variability: moderate  Accelerations: yes  Decelerations: intermittent variable    PPROM: h/o PPROM (). Declined [place].   -Pit infusing, titrate as appropriate   -PCN for GBS ppx   -FHR/toco monitor   -NICU consult for pending  delivery   -Anticipate   -will plan to check in around 11PM- If Pt feeling pressure and contractions adequate will recheck. Otherwise will hold off to avoid frequent checks.       Anemia: POA 12.0.  On iron throughout pregnancy.      Alpha thal silent carrier: positive on [place]      HPV+: Had HPV+ pap   -repeat pap in one year     Patient discussed with Dr. Marcos Galvin (ob attending)     Deshaun Sutherland DO  Family Medicine Resident PGY3

## 2021-07-11 NOTE — PROGRESS NOTES
Problem: Vaginal Delivery: Postpartum Day 1  Goal: *Appropriate parent-infant bonding  Outcome: Progressing Towards Goal   Pt.  Is ambulating to the NICU to be with infant

## 2021-07-11 NOTE — PROGRESS NOTES
8625: Bedside and Verbal shift change report given to PAU Bell RN (oncoming nurse) by MAT Raya RN (offgoing nurse). Report included the following information SBAR, Kardex, Intake/Output, MAR, Recent Results, Med Rec Status and Quality Measures. 2405: While using , this RN went through menu with pt for bfast. This RN then ordered bfast for pt at this time. 1694: Pt ambulating with this RN to NICU at this time. 1045: Pt walking back from NICU to  at this time. 1200: Rounding on pt at this time. Pt resting in bed with FOB present. Lunch ordered for pt.     1330: Rounding on pt at this time. Pt denies pain. PIV removed. VSS,     1430: pt showering at this time. 1515: 4th baby band given to FOB at this time as assigned by pt.     1700: Pt and FOB ambulating to NICU at this time. 1730: Pt and FOB returning to  from NICU at this time. 1800: Rounding on pt. Dinner ordered for p at this time. 1854: Bedside and Verbal shift change report given to MAT Raya RN (oncoming nurse) by Renetta Mendoza RN (offgoing nurse). Report included the following information SBAR, Kardex, Intake/Output, MAR, Recent Results, Med Rec Status and Quality Measures.

## 2021-07-11 NOTE — PROGRESS NOTES
[facility] Debbie L[address]pez 31   Office [phone], Fax [phone]                                Post-Partum Day Number 1 Progress Note    Patient doing well post-partum without significant complaint. Pain well controlled.  and Pt received extra bag of Pit just now. Ambulating  Voiding without difficulty. Vitals:    Patient Vitals for the past 8 hrs:   BP Temp Pulse Resp SpO2   21 0600 93/64  73     21 0259 (!) 112/55 98.1 °F (36.7 °C) 82 16    21 0153 108/65 98.5 °F (36.9 °C) 90 16    21 0139 (!) 94/53  91     21 0109 114/66  (!) 101     21 0053 114/63  82     21 0023 (!) 107/52  86     21 0009 (!) 103/44  (!) 101     21 0003 (!) 111/59  96     07/10/21 2353 134/63  98     07/10/21 2307     100 %   07/10/21 2305 94/61  88     07/10/21 2303 (!) 96/57  90     07/10/21 2302     100 %   07/10/21 2301 (!) 98/57  100     07/10/21 2259 (!) 101/56  80     07/10/21 2257 (!) 99/52  94  100 %   07/10/21 2253 99/60  92     07/10/21 2250 (!) 86/43  70     07/10/21 2246 (!) 77/50  96       Temp (24hrs), Av.2 °F (36.8 °C), Min:97.8 °F (36.6 °C), Max:98.5 °F (36.9 °C)      Exam:  Patient without distress. CTAB, no w/r/r/c.               RRR, +S1 and S2, no m/r/g. Abdomen soft, fundus firm at level of umbilicus, nontender. Perineum with normal lochia noted. Lower extremities:  No edema. No palpable cords or tenderness. Lab/Data Review:  No results found for this or any previous visit (from the past 12 hour(s)). Assessment and Plan:    Meagan Beauchamp is a 33 y.o. [id number] s/p  at 34 weeks 4 days. Pregnancy was complicated by PPROM, Anemia (on iron), Alpha thal silent carrier, HPV+. Patient appears to be having uncomplicated post-partum course. 1. Continue routine perineal care and maternal education.     2. Anemia -continue iron   3. HPV+- needs repeat pap in 1 year   4. Plan discharge tomorrow if no problems occur. Patient discussed with Dr. Lisandro Yoder.                  Carol Meza DO  Family Medicine Resident

## 2021-07-11 NOTE — PROGRESS NOTES
1854  Bedside and Verbal shift change report given to DIMITRIS Raya RN (oncoming nurse) by Igor Mo RN (offgoing nurse). Report included the following information SBAR, Kardex, Procedure Summary, Intake/Output, MAR, Accordion, Recent Results and Med Rec Status. 1918  Patient assessment. Assessment charted by RN christopher Palma, and audited by this RN.  0759  Reassessment completed, and pain medications administered. Assessment charted by Christopher Palma, and audited by this RN.  4554  Bedside and Verbal shift change report given to Valeria Lilly RN (oncoming nurse) by Jewell Houston RN (offgoing nurse). Report included the following information SBAR, Kardex, Procedure Summary, Intake/Output, MAR, Accordion, Recent Results and Med Rec Status.

## 2021-07-11 NOTE — PROGRESS NOTES
Problem: Patient Education: Go to Patient Education Activity  Goal: Patient/Family Education  7/11/2021 0305 by Jairo Brown Outcome: Progressing Towards Goal  7/10/2021 1926 by Jairo Brown   Outcome: Progressing Towards Goal

## 2021-07-11 NOTE — ANESTHESIA PROCEDURE NOTES
Epidural Block    Patient location during procedure: OB  Start time: 7/10/2021 8:04 PM  End time: 7/10/2021 8:14 PM  Reason for block: labor epidural  Staffing  Performed: attending   Anesthesiologist: Mauro Lovell MD  Preanesthetic Checklist  Completed: patient identified, IV checked, site marked, risks and benefits discussed, surgical consent, monitors and equipment checked, pre-op evaluation and timeout performed  Block Placement  Patient position: sitting  Prep: ChloraPrep  Sterility prep: cap, drape, gloves, hand and mask  Sedation level: no sedation  Patient monitoring: heart rate and continuous pulse oximetry  Approach: midline  Location: lumbar  Lumbar location: L3-L4  Epidural  Loss of resistance technique: saline  Guidance: landmark technique  Needle  Needle type: Tuohy   Needle gauge: 20 G  Needle length: 9 cm  Catheter type: multi-orifice  Catheter size: 20 G  Catheter securement method: clear occlusive dressing and surgical tape  Test dose: negative  Assessment  Block outcome: pain improved  Number of attempts: 1  Procedure assessment: patient tolerated procedure well with no immediate complications

## 2021-07-12 VITALS
SYSTOLIC BLOOD PRESSURE: 104 MMHG | HEART RATE: 61 BPM | DIASTOLIC BLOOD PRESSURE: 59 MMHG | HEIGHT: 60 IN | TEMPERATURE: 98.4 F | RESPIRATION RATE: 16 BRPM | WEIGHT: 177 LBS | OXYGEN SATURATION: 98 % | BODY MASS INDEX: 34.75 KG/M2

## 2021-07-12 PROCEDURE — 2709999900 HC NON-CHARGEABLE SUPPLY

## 2021-07-12 PROCEDURE — 74011250637 HC RX REV CODE- 250/637: Performed by: STUDENT IN AN ORGANIZED HEALTH CARE EDUCATION/TRAINING PROGRAM

## 2021-07-12 RX ORDER — OXYTOCIN/RINGER'S LACTATE 30/500 ML
PLASTIC BAG, INJECTION (ML) INTRAVENOUS
Status: DISCONTINUED
Start: 2021-07-12 | End: 2021-07-12 | Stop reason: WASHOUT

## 2021-07-12 RX ORDER — DOCUSATE SODIUM 100 MG/1
100 CAPSULE, LIQUID FILLED ORAL
Qty: 20 CAPSULE | Refills: 0 | Status: SHIPPED | OUTPATIENT
Start: 2021-07-12 | End: 2021-10-10

## 2021-07-12 RX ORDER — IBUPROFEN 800 MG/1
800 TABLET ORAL EVERY 12 HOURS
Qty: 30 TABLET | Refills: 0 | Status: SHIPPED | OUTPATIENT
Start: 2021-07-12

## 2021-07-12 RX ADMIN — IBUPROFEN 800 MG: 800 TABLET, FILM COATED ORAL at 03:03

## 2021-07-12 RX ADMIN — ACETAMINOPHEN 650 MG: 325 TABLET ORAL at 07:39

## 2021-07-12 NOTE — DISCHARGE INSTRUCTIONS
Patient Education        El período posparto: Instrucciones de cuidado-Instrucciones de cuidado  Postpartum: Care Instructions  Instrucciones de cuidado  Después del parto (período posparto), solano cuerpo pasa por muchos cambios. Algunos de estos cambios suceden josee varias semanas. [address], el cuerpo comienza a recuperarse y se prepara para el amamantamiento. Es posible que [address] se sienta sensible. Las hormonas pueden cambiar solano estado de ánimo sin advertencia y sin motivo aparente. Josee las primeras [address] después del parto, muchas mujeres tienen emociones que [place] de ontiveros a antoinette. Es posible que le resulte difícil dormir. Es posible que llore mucho. [place] se llama \"melancolía de la maternidad\". Estas emociones abrumadoras suelen desaparecer dentro de unos días o semanas. Farhan es importante hablar con solano médico acerca de edyta sentimientos. Josee las primeras semanas después del [place], es fácil cansarse demasiado o sentirse [place]. No geri demasiados esfuerzos. Descanse cada vez que pueda, acepte la ayuda de los demás, coma jon y teresa abundantes líquidos. En el primer par de Nannette Jimenez de joaquin a sugar, es posible que solano médico o partera deseen verla y hacer un plan para cualquier atención de seguimiento que usted pueda necesitar. Probablemente tendrá Dewain Crease nadeen completa de posparto dentro de los primeros 3 meses después del [place]. En bruce momento, solano médico o partera revisarán solano recuperación del parto. Él o ishmael también verán cómo está enfrentando usted edyta emociones y conversarán sobre edyta inquietudes y preguntas. La atención de seguimiento es raymond parte clave de solano tratamiento y seguridad. Asegúrese de hacer y acudir a todas las citas, y llame a solano médico si está teniendo problemas. También es raymond buena idea saber los resultados de edyta exámenes y mantener raymond lista de los medicamentos que arnulfo. ¿Cómo puede cuidarse en el hogar?   · Duerma o descanse cuando solano bebé lo geri. · Si es posible, permita que kenia familiares o kenia amigos la ayuden con las tareas del hogar. No intente hacerlo todo usted chidi. · Si tiene hemorroides o hinchazón o dolor alrededor de la abertura de la vagina, pruebe aplicarse frío y calor. Puede aplicarse hielo o raymond compresa fría en la natlaie josee 10 a 20 minutos cada vez. Póngase un paño saldana entre el hielo y la piel. Además, trate de sentarse en algunos centímetros de agua tibia (baño de asiento) 3 veces al día y después de las evacuaciones. · [employer] analgésicos (medicamentos para el dolor) exactamente según las indicaciones. ? Si el médico le recetó un analgésico, tómelo según las indicaciones. ? Si no está tomando un analgésico recetado, pregúntele a solano médico si puede kurt mary anne de [employer]. · Coma más fibra para evitar el estreñimiento. Incluya alimentos omer panes y cereales integrales, verduras crudas, frutas crudas y secas, y frijoles (habichuelas). · Akila mucho líquido. Si tiene raymond enfermedad renal, cardíaca o hepática y tiene que restringir los líquidos, hable con solano médico antes de aumentar la cantidad de líquido que larry. · No se lave el interior de la vagina con líquidos (lavados vaginales). · Si tiene puntos de sutura, mantenga la natalie limpia con agua tibia, ya sea echándola o rociándola sobre la natalie externa de la vagina y el ano después de usar el baño. · Lleve raymond lista de preguntas para hacerle a solano médico o partera. [place] preguntas podrían ser acerca de lo siguiente:  ? Cambios en los senos, omer bultos o sensibilidad. ? Cuándo esperar que regrese solano período menstrual.  ? Qué forma de anticoncepción es la más adecuada para usted. ? El peso que aumentó josee el [facility]. ? Las opciones de [address]. ? Delorise Dome y bebidas son mejores para usted, sobre todo si está amamantando. ? Problemas que podría tener con la lactancia. ? Cuándo puede [employer].  Algunas mujeres mandy vez quieran hablar sobre lubricantes vaginales. ? Cualquier sentimiento de tristeza o inquietud que tenga. ¿Cuándo debe pedir ayuda? Llame al 911 en cualquier momento que considere que necesita atención de [place]. Por ejemplo, llame si:    · Tiene pensamientos de lastimarse o de hacerle daño a solano bebé o a otra persona.     · Se desmayó (perdió el conocimiento).     · Tiene dolor en el pecho, le falta el aire o tose vicky.     · Tiene convulsiones. Llame a solano médico ahora mismo o busque atención médica de inmediato si:    · Solano sangrado vaginal parece hacerse más abundante.     · Se siente mareada o aturdida, o que está a punto de desmayarse.     · Tiene fiebre.     · Tiene dolor abdominal nuevo o más intenso.     · Tiene síntomas de un coágulo de vicky en la pierna (que se llama trombosis venosa profunda), omer:  ? Dolor en la pantorrilla, el muslo, la aileen o detrás de la rodilla. ? Enrojecimiento e hinchazón en la pierna o la aileen.     · Tiene señales de preeclampsia, omer:  ? Hinchazón repentina de la jose d, las rian o los pies. ? Nuevos problemas de la vista (omer oscurecimiento, hannah borroso o hannah puntos). ? Dolor de juan manuel intenso. Preste especial atención a los cambios en solano armaan y asegúrese de comunicarse con solano médico si:    · Tiene nuevo flujo vaginal o haider empeora.     · Se siente antoinette o deprimida.     · Tiene problemas con los senos o el amamantamiento. ¿Dónde puede encontrar más información en inglés? Vaya a http://www.isaacs.com/  Kalyn [id number] en la búsqueda para aprender más acerca de \"El período posparto: Instrucciones de cuidado-Instrucciones de cuidado. \"  Revisado: 8 octubre, 2020               Versión del contenido: 12.8  © 2006-2021 Healthwise, [facility]. Las instrucciones de cuidado fueron adaptadas bajo licencia por [employer] (which disclaims liability or warranty for this information).  Si usted tiene [place] afección [facility] o sobre estas instrucciones, siempre pregunte a solano profesional de armaan. Inivata, [facility] niega toda garantía o responsabilidad por solano uso de esta información. Patient Discharge Instructions    Elaine Cousin / [de-identified] : 1990    Admitted 7/10/2021 Discharged: 2021       Por favor tenga haider documento presente en solano nadeen de seguimiento con solano médico primario. Primary care provider:  DANNA Borrego    Discharging provider:  Serina Perez MD  - Family Medicine Resident  Kyle Gil MD -  OBGYN attending          Sabino Caceres:   premature rupture of membranes Dot Santiago DE CUIDADO:     Follow-up Information     Follow up With Specialties Details Why Contact Info    Sandeep Sharma [place] Physician Assistant   [address] [id number] [phone]      [address] [place] [address]  raymond nadeen postparto para mama en [facility]. [address]  [phone]          Continue Tratamiento:  - Por favor continue Motrin 800 mg, 1 tableta cada 12 mientras sea necesario para el dolor.  - Por favor continue Colace 100 mg para el estreñimiento, tome 1 tableta dos veces al día hasta que pueda defercar regularmente sin necesidad del medicamento. - Por favor comience Ferrous Sulfate 325 mg para la anemia, [place] 1 tableta cada día con jugo de naranja. - Por favor continue tomando edyta vitaminas prenatales. Importante que [employer] siguientes síntomas: dolor de [place], falta de [place], [place], escalofríos, náusea, vómito, diarrea, cambios en solano estado mental, caídas, debilidad y sangrado. DIETA/que comer:  Regular     ACTIVIDAD FÍSICA:   Instrucciones de [facility] Física    No levante nada que sea más pesado que solano bebé por las próximas 6 semanas. No insertar nada por la vaginal por 6 semanas.  Luego del parto por cesárea/ vaginal debe evitar tener relaciones sexuales por 6 semanas. Tendrá solano nadeen de seguimiento posparto a las 6 semanas. Puede manejar solano vehículo mientras no esté tomando Percocet ni ningún medicamento nárcotico (Motrin está jon). Cuidado de [place]:  llene el jaja bottle con agua tibia y exprima hasta que salga un chorro de agua por la boquilla para ayudarle a limpiar el área perineal.      Entiendo que si surge algún problema cuando llegue a mi hogar puedo contactar a mi médico.    Me cordero explicado las siguientes instrucciones y cordero aclarado mis dudas. Entiendo y reconozco las instrucciones brindadas.                                                                                                                                                Firma de Christiano Pilon / Mayra Cushing                                                                 Fecha/Hora                                                                                                                                              Firma de paciente ó representante                                                         Fecha/Hora

## 2021-07-12 NOTE — PROGRESS NOTES
Problem: Patient Education: Go to Patient Education Activity  Goal: Patient/Family Education  Outcome: Progressing Towards Goal     Problem: Vaginal Delivery: Day of Deliver-Laboring  Goal: Off Pathway (Use only if patient is Off Pathway)  Outcome: Resolved/Met  Goal: Activity/Safety  Outcome: Resolved/Met  Goal: Consults, if ordered  Outcome: Resolved/Met  Goal: Diagnostic Test/Procedures  Outcome: Resolved/Met  Goal: Nutrition/Diet  Outcome: Resolved/Met  Goal: Discharge Planning  Outcome: Resolved/Met  Goal: Medications  Outcome: Resolved/Met  Goal: Respiratory  Outcome: Resolved/Met  Goal: Treatments/Interventions/Procedures  Outcome: Resolved/Met  Goal: *Vital signs within defined limits  Outcome: Resolved/Met  Goal: *Labs within defined limits  Outcome: Resolved/Met  Goal: *Hemodynamically stable  Outcome: Resolved/Met  Goal: *Optimal pain control at patient's stated goal  Outcome: Resolved/Met     Problem: Vaginal Delivery: Day of Delivery-Post delivery  Goal: Off Pathway (Use only if patient is Off Pathway)  Outcome: Resolved/Met  Goal: Activity/Safety  Outcome: Resolved/Met  Goal: Consults, if ordered  Outcome: Resolved/Met  Goal: Nutrition/Diet  Outcome: Resolved/Met  Goal: Discharge Planning  Outcome: Resolved/Met  Goal: Medications  Outcome: Resolved/Met  Goal: Treatments/Interventions/Procedures  Outcome: Resolved/Met  Goal: *Vital signs within defined limits  Outcome: Resolved/Met  Goal: *Labs within defined limits  Outcome: Resolved/Met  Goal: *Hemodynamically stable  Outcome: Resolved/Met  Goal: *Optimal pain control at patient's stated goal  Outcome: Resolved/Met  Goal: *Participates in infant care  Outcome: Resolved/Met  Goal: *Demonstrates progressive activity  Outcome: Resolved/Met  Goal: *Tolerating diet  Outcome: Resolved/Met     Problem: Vaginal Delivery: Postpartum Day 1  Goal: Off Pathway (Use only if patient is Off Pathway)  Outcome: Resolved/Met  Goal: Activity/Safety  Outcome: Resolved/Met  Goal: Diagnostic Test/Procedures  Outcome: Resolved/Met  Goal: Discharge Planning  Outcome: Resolved/Met  Goal: Medications  Outcome: Resolved/Met  Goal: Treatments/Interventions/Procedures  Outcome: Resolved/Met  Goal: Psychosocial  Outcome: Resolved/Met  Goal: *Labs within defined limits  Outcome: Resolved/Met  Goal: *Hemodynamically stable  Outcome: Resolved/Met  Goal: *Optimal pain control at patient's stated goal  Outcome: Resolved/Met  Goal: *Participates in infant care  Outcome: Resolved/Met  Goal: *Demonstrates progressive activity  Outcome: Resolved/Met  Goal: *Appropriate parent-infant bonding  Outcome: Resolved/Met  Goal: *Tolerating diet  Outcome: Resolved/Met  Goal: *Performs self breast care  Outcome: Resolved/Met     Problem: Vaginal Delivery: Postpartum 2  Goal: Consults, if ordered  Outcome: Progressing Towards Goal  Goal: Discharge Planning  Outcome: Progressing Towards Goal  Goal: Medications  Outcome: Progressing Towards Goal  Goal: Treatments/Interventions/Procedures  Outcome: Progressing Towards Goal  Goal: Psychosocial  Outcome: Progressing Towards Goal     Problem: Vaginal Delivery: Discharge Outcomes  Goal: *Verbalizes name, dosage, time, side effects, and number of days to continue medications  Outcome: Progressing Towards Goal  Goal: *Describes available resources and support systems  Outcome: Progressing Towards Goal  Goal: *No signs and symptoms of infection  Outcome: Progressing Towards Goal  Goal: *Birth certificate information completed  Outcome: Progressing Towards Goal  Goal: *Received and verbalizes understanding of discharge plan and instructions  Outcome: Progressing Towards Goal  Goal: *Vital signs within defined limits  Outcome: Progressing Towards Goal  Goal: *Labs within defined limits  Outcome: Progressing Towards Goal  Goal: *Hemodynamically stable  Outcome: Progressing Towards Goal  Goal: *Optimal pain control at patient's stated goal  Outcome: Progressing Towards Goal  Goal: *Participates in infant care  Outcome: Progressing Towards Goal  Goal: *Demonstrates progressive activity  Outcome: Progressing Towards Goal  Goal: *Appropriate parent-infant bonding  Outcome: Progressing Towards Goal  Goal: *Tolerating diet  Outcome: Progressing Towards Goal

## 2021-07-12 NOTE — PROGRESS NOTES
0778-Bedside shift change report given to MERCEDEZ Hancock RN (oncoming nurse) by MAT Velazquez RN (offgoing nurse). Report included the following information SBAR, Intake/Output, MAR, Recent Results and Med Rec Status.      0755-Pt to NICU at this time    0925-RN gave EPDS with instructions for patient to fill out

## 2021-07-12 NOTE — LACTATION NOTE
This note was copied from a baby's chart. Pt will successfully establish breastfeeding by feeding in response to early feeding cues   or wake every 3h, will obtain deep latch, and will keep log of feedings/output. Taught to BF at hunger cues and or q 2-3 hrs and to offer 10-20 drops of hand expressed colostrum at any non-feeds. Breast Assessment  Left Breast: Medium, Engorged  Left Nipple: Short, Intact  Breast- Feeding Assessment  Attends Breast-Feeding Classes: No  Breast-Feeding Experience: Yes  Breast Trauma/Surgery: No  Type/Quality: Good     Mother/Infant Observation  Mother Observation: Alignment, Holds breast, Lets baby end feeding, Nipple round on release, Recognizes feeding cues  Infant Observation: Lips flanged, lower, Latches nipple and aereolae, Rhythmic suck, Relaxed after feeding, Opens mouth, Lips flanged, upper, Audible swallows  LATCH Documentation  Latch: Repeated attempts, hold nipple in mouth, stimulate to suck  Audible Swallowing: A few with stimulation  Type of Nipple: Everted (after stimulation)  Comfort (Breast/Nipple): Soft/non-tender  Hold (Positioning): No assist from staff, mother able to position/hold infant  LATCH Score: 8    Baby attempted to latch but after unsuccessful baby stopped trying. Placed a shield on Mom baby latched but did not suck. Dripped some formula and baby began to have sucking bursts. Baby fed for 30 minutes. Mom's breast are softer post feed.

## 2021-07-12 NOTE — PROGRESS NOTES
7/12/2021  10:42 AM    CM met with BRET to complete initial assessment and begin discharge planning. MOB verified and confirmed demographics. BRET lives with her mother- Amado Wang ( [phone]), along with her 6yr old, at the address on file. BRET is not employed and plans to be home with baby. FOB- Jose Elizalde ([phone]) is present and supportive. FOB is employed and will be taking time off. BRET reports she has good family support. BRET plans to breast and bottle feed baby. [facility]  will provide follow up care for infant. BRET has car seat, however MOB noted she does not have a bassinet yet. BRET has clothing, bottles and all necessary supplies for baby. BRET does not have insurance and is interested in applying for Medicaid for herself and for baby. BRET spoke with [employer] to complete application process for Medicaid. BRET is also in the process of enrolling in [facility] services. CM discussed baby Claudell Rana" admission to NICU. Baby born on 31w1d, 2545gr, currently on RA in isolette for thermal support. CM following for needs. Care Management Interventions  PCP Verified by CM: Yes ([facility])  Mode of Transport at Discharge:  Other (see comment)  Transition of Care Consult (CM Consult): Discharge Planning  Current Support Network: Own Home, Family Lives Nearby  Confirm Follow Up Transport: Family  Discharge Location  Discharge Placement: Home with family assistance  Jorge Peabody

## 2021-07-12 NOTE — PROGRESS NOTES
1300: this nurse went over all discharge instructions with patient at this time [facility]  [facility]. Pt informed of all sings/symptoms as to when to call md or 911. Pt verbalized understanding; pt informed of medications being called into her pharmacy and to make an appt with [facility] in 6 weeks for postpartum f/u visit. Pt asked appropriate questions at this time. Pt states she will return tomorrow to visit infant in NICU.

## 2021-07-12 NOTE — DISCHARGE SUMMARY
Obstetrical Discharge Summary     Name: Clarisa Mason MRN: [de-identified]  SSN: [id number]    YOB: 1990  Age: 33 y.o. Sex: female      Admit Date: 7/10/2021    Discharge Date: 2021     Admitting Physician: Lynnette Heredia MD     Attending Physician:  Eric Mcneill DO     Admission Diagnoses:  premature rupture of membranes [O42.919]    Discharge Diagnoses:   Information for the patient's :  Theador Cam, Male Gilmer Babinski [de-identified]   Delivery of a 2.545 kg male infant via Vaginal, Spontaneous on 7/10/2021 at 11:50 PM  by [facility]. Apgars were 9  and 9 . Additional Diagnoses:   Hospital Problems  Date Reviewed: 2021        Codes Class Noted POA     premature rupture of membranes ICD-10-CM: O42.919  ICD-9-CM: 658.10  7/10/2021 Unknown             Lab Results   Component Value Date/Time    Rubella, External Immune 2021 12:00 AM       Immunization(s):   Immunization History   Administered Date(s) Administered    Influenza Vaccine SeamBLiSS) PF (>6 Mo Flulaval, Fluarix, and [facility]    Tdap 2021        Hospital Course:   Patient is a 33 y.o. [id number] s/p  at 34w4d due to presentation for PPROM. At [facility], patient was amnisure positive and confirmed baby in cephalic position by ultrasound. PCN was given for GBS ppx and patient was started on pitocin for IOL. Pregnancy was complicated by h/o PTD, HPV+ pap, alpha thal silent carrier status, and anemia. Labor was uncomplicated and patient delivered TLMI by  without complications. Normal hospital course following the delivery. On day of discharge patient reported minimal lochia, well controlled pain, and no other complaints. Discharged with pain regimen and bowel regimen. Advised to continue prenatal vitamins and iron. Follow up with [facility] in six weeks.       Depression Scale         Condition at Discharge:  Stable  Disposition: Discharge to Home    Physical exam:  Visit Vitals  BP (!) 104/59   Pulse 61   Temp 97.9 °F (36.6 °C)   Resp 14   Ht 4' 11.84\" (1.52 m)   Wt 80.3 kg (177 lb)   LMP 11/10/2020 (Exact Date) Comment: pt thought she misscarried in January   SpO2 100%   Breastfeeding Unknown   BMI 34.75 kg/m²       Exam:  Patient without distress. CTAB, no w/r/r/c               RRR, + S1 and S2, no m/r/g               Abdomen soft, fundus firm at level of umbilicus,  non tender               Perineum with normal lochia noted. Lower extremities are negative for swelling,  cords or tenderness. Patient Instructions:   Current Discharge Medication List      START taking these medications    Details   docusate sodium (COLACE) 100 mg capsule Take 1 Capsule by mouth daily as needed for Constipation for up to 90 days. Qty: 20 Capsule, Refills: 0  Start date: 7/12/2021, End date: 10/10/2021      ibuprofen (MOTRIN) 800 mg tablet Take 1 Tablet by mouth every twelve (12) hours every twelve (12) hours. Qty: 30 Tablet, Refills: 0  Start date: 7/12/2021         CONTINUE these medications which have NOT CHANGED    Details   prenatal vit-iron fumarate-fa (PRENATAL PLUS with IRON) 28 mg iron- 800 mcg tab Take 1 Tablet by mouth daily. Indications: pregnancy  Qty: 90 Tablet, Refills: 5    Associated Diagnoses: 13 weeks gestation of pregnancy      ferrous sulfate (Iron) 325 mg (65 mg iron) tablet Take 1 Tab by mouth three (3) times daily (with meals). Qty: 90 Tab, Refills: 5    Associated Diagnoses: 13 weeks gestation of pregnancy      ergocalciferol (ERGOCALCIFEROL) 1,250 mcg (50,000 unit) capsule Take 1 Cap by mouth every seven (7) days. Qty: 12 Cap, Refills: 0    Associated Diagnoses: 13 weeks gestation of pregnancy               Reference my discharge instructions. Follow Up Information:   Patient will follow up with [address] [place] [address] in six weeks. Will contact with date and time of appointment.        Signed By:  Chi Esteves Kirit Raphael MD    Family Medicine Resident

## 2021-08-27 ENCOUNTER — ROUTINE PRENATAL (OUTPATIENT)
Dept: FAMILY MEDICINE CLINIC | Age: 31
End: 2021-08-27

## 2021-08-27 VITALS
RESPIRATION RATE: 17 BRPM | OXYGEN SATURATION: 96 % | TEMPERATURE: 97.8 F | BODY MASS INDEX: 30.82 KG/M2 | DIASTOLIC BLOOD PRESSURE: 65 MMHG | SYSTOLIC BLOOD PRESSURE: 102 MMHG | HEIGHT: 60 IN | HEART RATE: 88 BPM | WEIGHT: 157 LBS

## 2021-08-27 DIAGNOSIS — Z30.09 GENERAL COUNSELLING AND ADVICE ON CONTRACEPTION: ICD-10-CM

## 2021-08-27 PROCEDURE — 0503F POSTPARTUM CARE VISIT: CPT | Performed by: STUDENT IN AN ORGANIZED HEALTH CARE EDUCATION/TRAINING PROGRAM

## 2021-08-27 NOTE — PROGRESS NOTES
[address] [facility] [address]   Office [phone], Fax [phone]    Subjective:     6 week postpartum visit    History provided by patient       6 Week Post Partum Note  33 y.o. female , presenting for 6 week postpartum visit s/p  at 34w4d due to PPROM. Patient was treated with PCN for GBS ppx. Pregnancy was complicated by h/o PTD, HPV+ pap, and anemia. Labor and delivery course was uncomplicated. Patient doing well post-partum without significant complaint. Lochia: normal   Pain: controlled  Baby: doing well, has seen PCP  Sexual activity: has not been sexually active in the last six weeks   Plan for contraception: in the past, depo injections but did not like. Plan for nexplanon. Breast/bottle: bottle feeding and breast feeding - 3 - 3oz bottles and then breast feeding breast feeding every 2-3 hours   Support from FOB/family: has a lot of support from family and FOB  Symptoms of depression: no change in appetite, no changes in sleep, no feelings of sadness or feeling down, no change in concentration, no SI/HI  [place] Depression Scale Score: 0    SocHx. - Denies smoking, alcohol use, illicit drug use  - Occupation: Not working currently    Review of Systems   Constitutional: Negative for chills, fever and weight loss. HENT: Negative for congestion and sinus pain. Eyes: Negative for pain and redness. Respiratory: Negative for cough and shortness of breath. Cardiovascular: Negative for chest pain and leg swelling. Gastrointestinal: Negative for abdominal pain, constipation, diarrhea, nausea and vomiting. Genitourinary: Negative for dysuria and flank pain. Musculoskeletal: Negative for back pain and myalgias. Skin: Negative for itching and rash. Neurological: Negative for weakness and headaches. Objective: There were no vitals taken for this visit. Exam:  Patient without distress.     Abdomen soft, fundus firm at level of umbilicus, nontender. Skin: warm and dry. Lower extremities:  No edema. No palpable cords or tenderness. Assessment and orders:     Assessment and Plan:    Laxmi Amado is a 33 y.o. [id number] s/p  at 34 weeks 4 days due to PPROM. Patient having uncomplicated post-partum course. Postpartum follow up   1. Continue routine care  2. Call clinic or make appointment for symptoms of sadness  3. Follow up for yearly well woman exam.      Contraception counseling - Patient interested in starting contraceptives. Discussed pills vs IUD vs implant. Patient most interested in Nexplanon implant at this time. - Provided handout with contact numbers in order for patient to set up Nexplanon placement    Pt was discussed with Dr. Pamela Benz (attending physician). I have reviewed patient medical and social history and medications. I have reviewed pertinent labs results and other data. I have discussed the diagnosis with the patient and the intended plan as seen in the above orders. The patient has received an after-visit summary and questions were answered concerning future plans. I have discussed medication side effects and warnings with the patient as well.     Cliff Jones MD   Resident [facility]

## 2021-08-27 NOTE — PROGRESS NOTES
Chief Complaint   Patient presents with   [address]     1. Have you been to the ER, urgent care clinic since your last visit? Hospitalized since your last visit? No    2. Have you seen or consulted any other health care providers outside of the [address] [place] [address] since your last visit? Include any pap smears or colon screening.  No

## 2021-08-30 RX ORDER — PRENATAL VIT NO.126/IRON/FOLIC 28MG-0.8MG
TABLET ORAL
Qty: 90 TABLET | Refills: 1 | Status: SHIPPED | OUTPATIENT
Start: 2021-08-30

## 2022-03-18 PROBLEM — D56.3 ALPHA THALASSEMIA SILENT CARRIER: Status: ACTIVE | Noted: 2021-03-19

## 2022-03-18 PROBLEM — O99.012 ANEMIA DURING PREGNANCY IN SECOND TRIMESTER: Status: ACTIVE | Noted: 2021-02-11

## 2022-03-19 PROBLEM — Z34.90 PREGNANCY: Status: ACTIVE | Noted: 2021-02-09

## 2022-03-19 PROBLEM — O41.8X10 SUBCHORIONIC HEMATOMA IN FIRST TRIMESTER: Status: ACTIVE | Noted: 2021-02-11

## 2022-03-19 PROBLEM — E55.9 VITAMIN D DEFICIENCY: Status: ACTIVE | Noted: 2021-07-09

## 2022-03-19 PROBLEM — O20.0 THREATENED ABORTION: Status: ACTIVE | Noted: 2021-02-09

## 2022-03-19 PROBLEM — O46.8X1 SUBCHORIONIC HEMATOMA IN FIRST TRIMESTER: Status: ACTIVE | Noted: 2021-02-11

## 2022-03-19 PROBLEM — O42.919 PRETERM PREMATURE RUPTURE OF MEMBRANES: Status: ACTIVE | Noted: 2021-07-10

## 2022-03-19 PROBLEM — Z87.59 HISTORY OF PRETERM PREMATURE RUPTURE OF MEMBRANES (PPROM): Status: ACTIVE | Noted: 2021-02-22

## 2022-03-20 PROBLEM — R87.810 PAPANICOLAOU SMEAR OF CERVIX WITH POSITIVE HIGH RISK HUMAN PAPILLOMA VIRUS (HPV) TEST: Status: ACTIVE | Noted: 2021-07-09

## 2022-03-20 PROBLEM — R30.0 DYSURIA: Status: ACTIVE | Noted: 2021-07-09

## 2023-05-14 RX ORDER — FERROUS SULFATE 325(65) MG
325 TABLET ORAL
COMMUNITY
Start: 2021-04-05

## 2023-05-14 RX ORDER — ERGOCALCIFEROL 1.25 MG/1
50000 CAPSULE ORAL
COMMUNITY
Start: 2021-04-05

## 2023-05-14 RX ORDER — IBUPROFEN 800 MG/1
800 TABLET ORAL EVERY 12 HOURS
COMMUNITY
Start: 2021-07-12